# Patient Record
Sex: MALE | Race: WHITE | NOT HISPANIC OR LATINO | Employment: OTHER | URBAN - METROPOLITAN AREA
[De-identification: names, ages, dates, MRNs, and addresses within clinical notes are randomized per-mention and may not be internally consistent; named-entity substitution may affect disease eponyms.]

---

## 2024-07-29 NOTE — PROGRESS NOTES
Subjective   Patient ID: Daniel Mcgarry is a 79 y.o. male who presents for No chief complaint on file..   HPI  PT PRESENTS FOR A F/U ON HIS H/O BLADDER CANCER.  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  PT HAS RECEIVED 6 WEEKS OF INDUCTION THERAPY -- LAST THERAPY WAS IN MARCH OF 2024  PT HAS A H/O PROSTATE CANCER - S/P RADICAL PROSTATECTOMY 2001  Are you experiencing:  Burning on urination -- NO  Pain on urination  -- NO  Urinary frequency -- NO  Urinary urgency -- NO  Urge incontinence -- NO  Urinary stress incontinence   SURINDER----PULL UPS changed per day -- 1-2 XX__, 2-3 __, 3-4__ 5 +__ -- DAMP  RARE ENURESIS  Nocturia-- 2 X ON AVG  Hematuria -- NO  Hesitancy -- NO  Post void fullness --   NO    Review of Systems  General-- No C/O fever or chills  Head-- No C/O Dizziness  Eyes-- NO  C/O blurry or double vision  Ears-- No C/O hearing loss  Neck-- Supple  Chest-- No C/O pain or discomfort  Lungs-- No C/O shortness of breath  Abdomen-- No C/O  pain or discomfort, No nausea or vomiting  Back-- No C/O back pain or discomfort  Extremities-- No C/O swelling or pain      Objective   Physical Exam    General-- well developed, well nourished in NAD  Head-- normal cephalic, atraumatic  Eyes-- PERRL, EOM'S FROM,  no  jaundice  Neck-- Supple, without masses  Chest-- Normal bony structure  Abdomen-- soft, non tender, liver spleen not tender. No supra pubic masses,RECTUS DIASTASES  Back-- no flank masses palpable, no CVA tenderness on palpation or perc;ussion  Lymph nodes-- No inguinal lymphadenopathy noted  Prostate-- FOSSA firm, smooth, non tender,without nodules  Testis-- both down, non tender, without masses  Epididymis-- no masses palpable  Scrotum -- no hydrocele noted  Extremities -- Normal muscle mass and tone for the patients age  Neurological-- oriented times three    PVR-- 7 ML    Assessment/Plan   A:  H/O BLADDER CANCER -- PT JUST FINISHED 6 WEEKS OF BCG THERAPY MARCH 2024  REMOTE H/O PROSTATE  CANCER -- REMOTE RADICAL PROSTATECTOMY 2001  URINARY STRESS INCONTINENCE SECONDARY TO THE ABOVE     REMOTE MI MARCH 2024    P:  OBTAIN ALL OLD RECORDS FROM [facility]  F/U IN ONE WEEK FOR A SURVEILLANCE CYSTO   OBTAIN LATEST PSA FROM [facility]    Harman Castle MD 07/29/24 8:55 AM

## 2024-07-30 PROBLEM — R20.3 HYPERESTHESIA: Status: ACTIVE | Noted: 2024-07-30

## 2024-07-30 PROBLEM — C67.9 BLADDER CANCER (MULTI): Status: ACTIVE | Noted: 2024-07-30

## 2024-07-30 PROBLEM — H61.21 EXCESSIVE CERUMEN IN RIGHT EAR CANAL: Status: ACTIVE | Noted: 2024-07-30

## 2024-07-30 PROBLEM — C61 PROSTATE CANCER (MULTI): Status: ACTIVE | Noted: 2024-07-30

## 2024-07-30 PROBLEM — I10 HTN (HYPERTENSION): Status: ACTIVE | Noted: 2024-07-30

## 2024-07-30 PROBLEM — H60.91 RIGHT OTITIS EXTERNA: Status: ACTIVE | Noted: 2024-07-30

## 2024-07-30 PROBLEM — G56.01 CARPAL TUNNEL SYNDROME ON RIGHT: Status: ACTIVE | Noted: 2024-07-30

## 2024-07-30 PROBLEM — E78.5 HYPERLIPIDEMIA: Status: ACTIVE | Noted: 2024-07-30

## 2024-07-30 PROBLEM — K21.9 ACID REFLUX: Status: ACTIVE | Noted: 2024-07-30

## 2024-07-30 RX ORDER — LOSARTAN POTASSIUM 50 MG/1
TABLET ORAL
COMMUNITY
Start: 2014-08-11 | End: 2024-07-31 | Stop reason: WASHOUT

## 2024-07-30 RX ORDER — OMEPRAZOLE 20 MG/1
1 CAPSULE, DELAYED RELEASE ORAL DAILY
COMMUNITY
Start: 2015-11-04

## 2024-07-31 ENCOUNTER — OFFICE VISIT (OUTPATIENT)
Dept: UROLOGY | Facility: CLINIC | Age: 79
End: 2024-07-31
Payer: COMMERCIAL

## 2024-07-31 VITALS
BODY MASS INDEX: 29.57 KG/M2 | DIASTOLIC BLOOD PRESSURE: 69 MMHG | TEMPERATURE: 97.2 F | SYSTOLIC BLOOD PRESSURE: 134 MMHG | HEIGHT: 66 IN | WEIGHT: 184 LBS

## 2024-07-31 DIAGNOSIS — C61 PROSTATE CANCER (MULTI): Primary | ICD-10-CM

## 2024-07-31 DIAGNOSIS — N39.3 URINARY STRESS INCONTINENCE, MALE: ICD-10-CM

## 2024-07-31 DIAGNOSIS — C67.2 MALIGNANT NEOPLASM OF LATERAL WALL OF URINARY BLADDER (MULTI): ICD-10-CM

## 2024-07-31 DIAGNOSIS — R35.1 NOCTURIA: ICD-10-CM

## 2024-07-31 DIAGNOSIS — R32 ENURESIS: ICD-10-CM

## 2024-07-31 DIAGNOSIS — N40.0 BPH WITHOUT OBSTRUCTION/LOWER URINARY TRACT SYMPTOMS: ICD-10-CM

## 2024-07-31 LAB
POC APPEARANCE, URINE: CLEAR
POC BILIRUBIN, URINE: NEGATIVE
POC BLOOD, URINE: NEGATIVE
POC COLOR, URINE: YELLOW
POC GLUCOSE, URINE: NEGATIVE MG/DL
POC KETONES, URINE: NEGATIVE MG/DL
POC LEUKOCYTES, URINE: NEGATIVE
POC NITRITE,URINE: NEGATIVE
POC PH, URINE: 5.5 PH
POC PROTEIN, URINE: NEGATIVE MG/DL
POC SPECIFIC GRAVITY, URINE: 1.02
POC UROBILINOGEN, URINE: 0.2 EU/DL

## 2024-07-31 PROCEDURE — 81003 URINALYSIS AUTO W/O SCOPE: CPT | Performed by: UROLOGY

## 2024-07-31 PROCEDURE — 1036F TOBACCO NON-USER: CPT | Performed by: UROLOGY

## 2024-07-31 PROCEDURE — 99215 OFFICE O/P EST HI 40 MIN: CPT | Performed by: UROLOGY

## 2024-07-31 PROCEDURE — 1126F AMNT PAIN NOTED NONE PRSNT: CPT | Performed by: UROLOGY

## 2024-07-31 PROCEDURE — 1160F RVW MEDS BY RX/DR IN RCRD: CPT | Performed by: UROLOGY

## 2024-07-31 PROCEDURE — 3078F DIAST BP <80 MM HG: CPT | Performed by: UROLOGY

## 2024-07-31 PROCEDURE — 1159F MED LIST DOCD IN RCRD: CPT | Performed by: UROLOGY

## 2024-07-31 PROCEDURE — 3075F SYST BP GE 130 - 139MM HG: CPT | Performed by: UROLOGY

## 2024-07-31 RX ORDER — ATORVASTATIN CALCIUM 40 MG/1
TABLET, FILM COATED ORAL
COMMUNITY
Start: 2024-07-08

## 2024-07-31 RX ORDER — CLOPIDOGREL BISULFATE 75 MG/1
TABLET ORAL
COMMUNITY
Start: 2024-05-22

## 2024-07-31 RX ORDER — METOPROLOL TARTRATE 25 MG/1
TABLET, FILM COATED ORAL
COMMUNITY
Start: 2024-07-22

## 2024-07-31 RX ORDER — ASPIRIN 81 MG/1
81 TABLET ORAL
COMMUNITY
Start: 2024-03-14

## 2024-07-31 RX ORDER — PREDNISONE 10 MG/1
TABLET ORAL
COMMUNITY
Start: 2024-07-17

## 2024-07-31 RX ORDER — AMLODIPINE BESYLATE 5 MG/1
TABLET ORAL
COMMUNITY
Start: 2024-07-02

## 2024-07-31 RX ORDER — LISINOPRIL 10 MG/1
TABLET ORAL
COMMUNITY
Start: 2024-07-08

## 2024-07-31 SDOH — ECONOMIC STABILITY: FOOD INSECURITY: WITHIN THE PAST 12 MONTHS, THE FOOD YOU BOUGHT JUST DIDN'T LAST AND YOU DIDN'T HAVE MONEY TO GET MORE.: NEVER TRUE

## 2024-07-31 SDOH — ECONOMIC STABILITY: FOOD INSECURITY: WITHIN THE PAST 12 MONTHS, YOU WORRIED THAT YOUR FOOD WOULD RUN OUT BEFORE YOU GOT MONEY TO BUY MORE.: NEVER TRUE

## 2024-07-31 ASSESSMENT — ENCOUNTER SYMPTOMS
LOSS OF SENSATION IN FEET: 0
DEPRESSION: 0
OCCASIONAL FEELINGS OF UNSTEADINESS: 0

## 2024-07-31 ASSESSMENT — LIFESTYLE VARIABLES
HOW OFTEN DO YOU HAVE A DRINK CONTAINING ALCOHOL: MONTHLY OR LESS
SKIP TO QUESTIONS 9-10: 1
AUDIT-C TOTAL SCORE: 1
HOW MANY STANDARD DRINKS CONTAINING ALCOHOL DO YOU HAVE ON A TYPICAL DAY: 1 OR 2
HOW OFTEN DO YOU HAVE SIX OR MORE DRINKS ON ONE OCCASION: NEVER

## 2024-07-31 ASSESSMENT — PATIENT HEALTH QUESTIONNAIRE - PHQ9
SUM OF ALL RESPONSES TO PHQ9 QUESTIONS 1 AND 2: 0
1. LITTLE INTEREST OR PLEASURE IN DOING THINGS: NOT AT ALL
2. FEELING DOWN, DEPRESSED OR HOPELESS: NOT AT ALL

## 2024-07-31 ASSESSMENT — COLUMBIA-SUICIDE SEVERITY RATING SCALE - C-SSRS
6. HAVE YOU EVER DONE ANYTHING, STARTED TO DO ANYTHING, OR PREPARED TO DO ANYTHING TO END YOUR LIFE?: NO
1. IN THE PAST MONTH, HAVE YOU WISHED YOU WERE DEAD OR WISHED YOU COULD GO TO SLEEP AND NOT WAKE UP?: NO
2. HAVE YOU ACTUALLY HAD ANY THOUGHTS OF KILLING YOURSELF?: NO

## 2024-07-31 ASSESSMENT — PAIN SCALES - GENERAL: PAINLEVEL: 0-NO PAIN

## 2024-08-02 LAB
LABORATORY COMMENT REPORT: NORMAL
LABORATORY COMMENT REPORT: NORMAL
PATH REPORT.FINAL DX SPEC: NORMAL
PATH REPORT.GROSS SPEC: NORMAL
PATH REPORT.RELEVANT HX SPEC: NORMAL
PATH REPORT.TOTAL CANCER: NORMAL

## 2024-08-14 ENCOUNTER — APPOINTMENT (OUTPATIENT)
Dept: UROLOGY | Facility: CLINIC | Age: 79
End: 2024-08-14
Payer: COMMERCIAL

## 2024-08-14 VITALS
TEMPERATURE: 97.8 F | HEART RATE: 71 BPM | BODY MASS INDEX: 34.04 KG/M2 | HEIGHT: 62 IN | DIASTOLIC BLOOD PRESSURE: 72 MMHG | SYSTOLIC BLOOD PRESSURE: 144 MMHG | WEIGHT: 185 LBS

## 2024-08-14 DIAGNOSIS — C67.9 MALIGNANT NEOPLASM OF URINARY BLADDER, UNSPECIFIED SITE (MULTI): ICD-10-CM

## 2024-08-14 LAB
POC APPEARANCE, URINE: CLEAR
POC BILIRUBIN, URINE: NEGATIVE
POC BLOOD, URINE: NEGATIVE
POC COLOR, URINE: YELLOW
POC GLUCOSE, URINE: NEGATIVE MG/DL
POC KETONES, URINE: NEGATIVE MG/DL
POC LEUKOCYTES, URINE: ABNORMAL
POC NITRITE,URINE: NEGATIVE
POC PH, URINE: 5.5 PH
POC PROTEIN, URINE: NEGATIVE MG/DL
POC SPECIFIC GRAVITY, URINE: 1.02
POC UROBILINOGEN, URINE: 0.2 EU/DL

## 2024-08-14 PROCEDURE — 81003 URINALYSIS AUTO W/O SCOPE: CPT | Mod: QW | Performed by: UROLOGY

## 2024-08-14 PROCEDURE — 52000 CYSTOURETHROSCOPY: CPT | Performed by: UROLOGY

## 2024-08-14 RX ORDER — CEPHALEXIN 250 MG/1
250 CAPSULE ORAL 2 TIMES DAILY
Qty: 4 CAPSULE | Refills: 0 | Status: SHIPPED | OUTPATIENT
Start: 2024-08-14 | End: 2024-08-21

## 2024-08-14 ASSESSMENT — ENCOUNTER SYMPTOMS
OCCASIONAL FEELINGS OF UNSTEADINESS: 0
LOSS OF SENSATION IN FEET: 0

## 2024-08-14 ASSESSMENT — PAIN SCALES - GENERAL: PAINLEVEL: 0-NO PAIN

## 2024-08-14 NOTE — LETTER
August 14, 2024     No Recipients    Patient: Daniel Mcgarry   YOB: 1945   Date of Visit: 8/14/2024       Dear Dr. Grey Recipients:    Thank you for referring Daniel Mcgarry to me for evaluation. Below are my notes for this consultation.  If you have questions, please do not hesitate to call me. I look forward to following your patient along with you.       Sincerely,     Harman Ramsey MD      CC: No Recipients  ______________________________________________________________________________________    Patient ID: Daniel Mcgarry is a 79 y.o. male.  Subjective  Patient ID: Daniel Mcgarry is a 79 y.o. male who presents for No chief complaint on file..  Procedure:  After informed consent was obtained, the patient was taken to the procedure room for cystoscopy due to AH/O BLADDER CANCER - PT S/P 6 WEEKS OF BCG THERAPY .     Cystoscopy     Procedure Note:    A sterile prep and drape was performed in standard fashion. Lidocaine was used for topical anesthesia. A flexible cystoscope was inserted into the urethra without difficulty revealing normal urethra.     The prostate  IS OBSTRUCTING     Then entered the bladder revealing bladder mucosa with no erythematous patches or plaques, foreign bodies, stones or papillary lesions. The ureteral orifices were visualized bilaterally. These were orthotopic in location and effluxing clear urine. No masses were seen on retroflexion.     Post-Procedure:   The cystoscope was removed. The vital signs were stable . The patient tolerated the procedure well. There were no complications.       A:   REMOTE H/O BLADDER CANCER-- PT IS GETTING INTRAVESICAL BCG  CYSTO TODAY IS WNL    P:  REASSURANCE, EDUCATION RENDERED TO THE PT  CONTINUE INTRAVESICAL BCG  F/U IN 3 MONTHS FOR A SURVEILLANCE CYSTO     HARMAN RAMSEY MD  8-14-24

## 2024-08-14 NOTE — PROGRESS NOTES
Patient ID: Daniel Mcgarry is a 79 y.o. male.  Subjective   Patient ID: Daniel Mcgarry is a 79 y.o. male who presents for No chief complaint on file..  Procedure:  After informed consent was obtained, the patient was taken to the procedure room for cystoscopy due to AH/O BLADDER CANCER - PT S/P 6 WEEKS OF BCG THERAPY .     Cystoscopy     Procedure Note:    A sterile prep and drape was performed in standard fashion. Lidocaine was used for topical anesthesia. A flexible cystoscope was inserted into the urethra without difficulty revealing normal urethra.     The prostate  IS ABSENT    Then entered the bladder revealing bladder mucosa with no erythematous patches or plaques, foreign bodies, stones or papillary lesions. The ureteral orifices were visualized bilaterally. These were orthotopic in location and effluxing clear urine. No masses were seen on retroflexion.     Post-Procedure:   The cystoscope was removed. The vital signs were stable . The patient tolerated the procedure well. There were no complications.     7-31-24 -- URINE CYTOLOGY -- RARE ATYPICAL CELL -- NOT THOUGHT TO BE SECONDARY TO UROTHELIAL CARCINOMA       A: PT S/P TURBT 11-6-13 -- NON INVASIVE UROTHELIAL CARCINOMA  PT S/P BLADDER BX ON 1-22-19-- CARCINOMA IN SITU NOTED -- BCG STARTED ON 3-12-19  PT S/P BLADDER BX ON 9-3-19-- PERSISTENT CIS-- BCG WITH INTERFERON STARTED-- INTERFERON STOPPED ON 11-12-19 SECONDARY TO COST  POSITIVE FISH TEST 3-1-20, 6-11-20, 3-11-21 AND 6-15-23  CYSTO'S, AND IVP DONE DURING THIS TIME WERE ALL NEG FOR OBVIOUS CANCER  PT S/P BLADDER BX ON 1-27-24 -- CIS AGAIN NOTED-- BCG RESTARTED ON 3-12-24  CYSTO IS WNL TODAY    PT S/P RADICAL PROSTATECTOMY 2-10-00 -- DR MEANS    P:  REASSURANCE, EDUCATION RENDERED TO THE PT  CONTINUE INTRAVESICAL BCG  F/U IN 3 MONTHS FOR A SURVEILLANCE CYSTO     IMELDA ARMSEY MD  8-14-24    ADDENDUM:  I CHANGED THE PROSTATE EXAM TO ABSENT FROM MILDLY OBSTRUCTING. THIS WAS MY ERROR IN DOCUMENTATION  BECAUSE  THE PROSTATE WAS REMOVED IN 2000

## 2024-08-21 ENCOUNTER — OFFICE VISIT (OUTPATIENT)
Dept: UROLOGY | Facility: CLINIC | Age: 79
End: 2024-08-21
Payer: COMMERCIAL

## 2024-08-21 DIAGNOSIS — C61 PROSTATE CANCER (MULTI): Primary | ICD-10-CM

## 2024-08-21 DIAGNOSIS — C67.9 MALIGNANT NEOPLASM OF URINARY BLADDER, UNSPECIFIED SITE (MULTI): ICD-10-CM

## 2024-08-21 PROCEDURE — 99443 PR PHYS/QHP TELEPHONE EVALUATION 21-30 MIN: CPT | Performed by: STUDENT IN AN ORGANIZED HEALTH CARE EDUCATION/TRAINING PROGRAM

## 2024-08-21 RX ORDER — ALBUTEROL SULFATE 0.83 MG/ML
3 SOLUTION RESPIRATORY (INHALATION) AS NEEDED
OUTPATIENT
Start: 2024-09-17

## 2024-08-21 RX ORDER — DIPHENHYDRAMINE HYDROCHLORIDE 50 MG/ML
50 INJECTION INTRAMUSCULAR; INTRAVENOUS AS NEEDED
OUTPATIENT
Start: 2024-09-17

## 2024-08-21 RX ORDER — FAMOTIDINE 10 MG/ML
20 INJECTION INTRAVENOUS ONCE AS NEEDED
OUTPATIENT
Start: 2024-09-17

## 2024-08-21 RX ORDER — ALBUTEROL SULFATE 0.83 MG/ML
3 SOLUTION RESPIRATORY (INHALATION) AS NEEDED
OUTPATIENT
Start: 2024-09-03

## 2024-08-21 RX ORDER — EPINEPHRINE 0.3 MG/.3ML
0.3 INJECTION SUBCUTANEOUS EVERY 5 MIN PRN
OUTPATIENT
Start: 2024-09-10

## 2024-08-21 RX ORDER — EPINEPHRINE 0.3 MG/.3ML
0.3 INJECTION SUBCUTANEOUS EVERY 5 MIN PRN
OUTPATIENT
Start: 2024-09-03

## 2024-08-21 RX ORDER — FAMOTIDINE 10 MG/ML
20 INJECTION INTRAVENOUS ONCE AS NEEDED
OUTPATIENT
Start: 2024-09-03

## 2024-08-21 RX ORDER — DIPHENHYDRAMINE HYDROCHLORIDE 50 MG/ML
50 INJECTION INTRAMUSCULAR; INTRAVENOUS AS NEEDED
OUTPATIENT
Start: 2024-09-10

## 2024-08-21 RX ORDER — DIPHENHYDRAMINE HYDROCHLORIDE 50 MG/ML
50 INJECTION INTRAMUSCULAR; INTRAVENOUS AS NEEDED
OUTPATIENT
Start: 2024-09-03

## 2024-08-21 RX ORDER — EPINEPHRINE 0.3 MG/.3ML
0.3 INJECTION SUBCUTANEOUS EVERY 5 MIN PRN
OUTPATIENT
Start: 2024-09-17

## 2024-08-21 RX ORDER — FAMOTIDINE 10 MG/ML
20 INJECTION INTRAVENOUS ONCE AS NEEDED
OUTPATIENT
Start: 2024-09-10

## 2024-08-21 RX ORDER — ALBUTEROL SULFATE 0.83 MG/ML
3 SOLUTION RESPIRATORY (INHALATION) AS NEEDED
OUTPATIENT
Start: 2024-09-10

## 2024-08-21 NOTE — PROGRESS NOTES
Subjective   Patient ID: Daniel Mcgarry is a 79 y.o. male who presents for bladder cancer, NMIBC.    Patient is here at request of Dr. Castle for management of NMIBC.  The records have been reviewed from the media tab from Dr. Castle's previously practice.    Bladder cancer:  Date of diagnosis: 11/2013  Path of diagnosis: pTa, unclear grade  Date of recurrence 1: 1/2019  Path at recurrence 1: CIS  Treatment at recurrence BCG 3/2019  Date of recurrence 2: 9/2019  Path at recurrence 2: CIS  Treatment at recurrence: BCG with IFN  Positive FISh in 3/2020, 6/2020, 3/2021 and 6/2023 yet cystoscopy and IVPs negative  Date of recurrence 3: 1/2024  Path at recurrence 3: CIS  Treatment at recurrence 3: BCG induction, last received 4/2024    He did get mBCG at some point.  He thinks this was 4-5 years ago.     Prostate cancer:  Patient has a history of prostate cancer s/p prostatectomy in 2001.  He has LADARIUS and is in 2 depends a day.    PMHx: GERD, HTN, HLD  PSHx: Prostatectomy  Social: Non Smoker  Family: No Bladder Cancer    Review of Systems  Negative unless discussed above    Objective   Physical Exam  Phone visit    Assessment/Plan   Problem List Items Addressed This Visit             ICD-10-CM    Bladder cancer (Multi) C67.9     Recurrent CIS over the years as above.  The criteria for BCG unresponsive disease are below in the diagram:            BCG refractory - failure to achieve CR within 6 months of initial BCG    BCG resistant - persistent tumor noted 3 months after initial BCG but to lesser degree/stage/grade which clears at 6 months    BCG relapsing - recurrent tumor in follow-up after achieving CR within 6 months of initial BCG    BCG intolerant - recurrent tumor in follow-up after inadequate initial BCG due to toxicity                  I cannot tell if he meets criteria for failure.  I do worry that he still harbors CIS in his urinary tract despite a negative white light cystoscopy and cytology.  I have a few  recommendations:    We can do mBCG for at least 1 year for now but this would be split vial dosing given BCG shortages.  We discussed how this has been shown to be effective just the same.  We discussed locations we give BCG and he prefers [place].  He knows he will have to sign consent on first day of treatment.  I would start the first mBCG now and then if continues to respond do this at 6 and 12 months from induction regimen.  I would highly recommend at time of next surveillance cystoscopy to do blue light cystoscopy with directed and random bladder biopsies and a biopsy of the prostatic urethra to ensure that CIS is not hiding.  We went over the data regarding CIS and blue light.  If he has a recurrence again he should not get BCG and move to other options which I am happy to help with.         Prostate cancer (Multi) - Primary C61            Anshu Neal MD MPH 08/21/24 8:33 AM

## 2024-08-21 NOTE — ASSESSMENT & PLAN NOTE
Recurrent CIS over the years as above.  The criteria for BCG unresponsive disease are below in the diagram:            BCG refractory - failure to achieve CR within 6 months of initial BCG    BCG resistant - persistent tumor noted 3 months after initial BCG but to lesser degree/stage/grade which clears at 6 months    BCG relapsing - recurrent tumor in follow-up after achieving CR within 6 months of initial BCG    BCG intolerant - recurrent tumor in follow-up after inadequate initial BCG due to toxicity                  I cannot tell if he meets criteria for failure.  I do worry that he still harbors CIS in his urinary tract despite a negative white light cystoscopy and cytology.  I have a few recommendations:    We can do mBCG for at least 1 year for now but this would be split vial dosing given BCG shortages.  We discussed how this has been shown to be effective just the same.  We discussed locations we give BCG and he prefers [place].  He knows he will have to sign consent on first day of treatment.  I would start the first mBCG now and then if continues to respond do this at 6 and 12 months from induction regimen.  I would highly recommend at time of next surveillance cystoscopy to do blue light cystoscopy with directed and random bladder biopsies and a biopsy of the prostatic urethra to ensure that CIS is not hiding.  We went over the data regarding CIS and blue light.  If he has a recurrence again he should not get BCG and move to other options which I am happy to help with.

## 2024-11-12 RX ORDER — LIDOCAINE HYDROCHLORIDE 20 MG/ML
1 JELLY TOPICAL ONCE
Status: COMPLETED | OUTPATIENT
Start: 2024-11-18 | End: 2024-11-18

## 2024-11-17 NOTE — PROGRESS NOTES
Patient ID: Daniel Mcgarry is a 79 y.o. male. PRESENTS FOR A SURVEILLANCE CYSTO IN FOLLOW UP ON HIS H/O BLADDER CANCER    Procedure:  After informed consent was obtained, the patient was taken to the procedure room for cystoscopy due to A H/O BLADDER CANCER .     Cystoscopy     Procedure Note:    A sterile prep and drape was performed in standard fashion. Lidocaine was used for topical anesthesia. A flexible cystoscope was inserted into the urethra without difficulty revealing normal urethra.     The prostate ABSENT     Then entered the bladder revealing bladder mucosa with no erythematous patches or plaques, foreign bodies, stones or papillary lesions. The ureteral orifices were visualized bilaterally. These were orthotopic in location and effluxing clear urine. No masses were seen on retroflexion. AREA MEDIAL TO THE RIGHT URETERAL ORIFICE APPEARS SLIGHTLY RAISED AND VERY SLIGHTLY IRREGULAR     Post-Procedure:   The cystoscope was removed. The vital signs were stable . The patient tolerated the procedure well. There were no complications.     PSA:  9-22-21-- < 0.01  10-1-20-- <  0.1  10-3-19-- < 0.1  4-21-16-- < 0.1    A:   PT S/P TURBT 11-6-13 -- NON INVASIVE UROTHELIAL CARCINOMA  PT S/P BLADDER BX ON 1-22-19-- CARCINOMA IN SITU NOTED -- BCG STARTED ON 3-12-19  PT S/P BLADDER BX ON 9-3-19-- PERSISTENT CIS-- BCG WITH INTERFERON STARTED-- INTERFERON STOPPED ON 11-12-19 SECONDARY TO COST  POSITIVE FISH TEST 3-1-20, 6-11-20, 3-11-21 AND 6-15-23  CYSTO'S, AND IVP DONE DURING THIS TIME WERE ALL NEG FOR OBVIOUS CANCER  PT S/P BLADDER BX ON 1-27-24 -- CIS AGAIN NOTED-- BCG RESTARTED ON 3-12-24    PT IS NO LONGER GETTING BCG THERAPY     CYSTO IS SLIGHTLY ABN  TODAY 11-18-24    PT S/P RADICAL PROSTATECTOMY 2-10-00 -- DR MEANS     P:  REASSURANCE, EDUCATION RENDERED TO THE PT  URINE FOR A CYTOLOGY -- IF POSITIVE WILL SCHEDULE A BLADDER BX OF THE AREA ADJACENT TO THE RIGHT URETERAL ORIFICE-- IF NEG --F/U IN 3 MONTHS FOR A  SURVEILLANCE CYSTO    11-25-24  URINE CYTOLOGY -- NEG FOR MALIGNANT CELLS  F/U IN 3 MONTHS FOR A SURVEILLANCE, CYSTO

## 2024-11-18 ENCOUNTER — APPOINTMENT (OUTPATIENT)
Dept: UROLOGY | Facility: CLINIC | Age: 79
End: 2024-11-18
Payer: COMMERCIAL

## 2024-11-18 VITALS
SYSTOLIC BLOOD PRESSURE: 150 MMHG | HEART RATE: 73 BPM | WEIGHT: 184 LBS | BODY MASS INDEX: 29.57 KG/M2 | DIASTOLIC BLOOD PRESSURE: 81 MMHG | TEMPERATURE: 97.8 F | HEIGHT: 66 IN

## 2024-11-18 DIAGNOSIS — C67.9 MALIGNANT NEOPLASM OF URINARY BLADDER, UNSPECIFIED SITE (MULTI): Primary | ICD-10-CM

## 2024-11-18 LAB
POC APPEARANCE, URINE: CLEAR
POC BILIRUBIN, URINE: NEGATIVE
POC BLOOD, URINE: ABNORMAL
POC COLOR, URINE: YELLOW
POC GLUCOSE, URINE: NEGATIVE MG/DL
POC KETONES, URINE: NEGATIVE MG/DL
POC LEUKOCYTES, URINE: ABNORMAL
POC NITRITE,URINE: NEGATIVE
POC PH, URINE: 5.5 PH
POC PROTEIN, URINE: NEGATIVE MG/DL
POC SPECIFIC GRAVITY, URINE: 1.01
POC UROBILINOGEN, URINE: 0.2 EU/DL

## 2024-11-18 PROCEDURE — 2500000005 HC RX 250 GENERAL PHARMACY W/O HCPCS: Performed by: UROLOGY

## 2024-11-18 PROCEDURE — 52000 CYSTOURETHROSCOPY: CPT | Performed by: UROLOGY

## 2024-11-18 PROCEDURE — 81003 URINALYSIS AUTO W/O SCOPE: CPT | Mod: QW | Performed by: UROLOGY

## 2024-11-18 RX ORDER — CEPHALEXIN 250 MG/1
250 CAPSULE ORAL 2 TIMES DAILY
Qty: 4 CAPSULE | Refills: 0 | Status: SHIPPED | OUTPATIENT
Start: 2024-11-18 | End: 2024-11-20

## 2024-11-18 ASSESSMENT — PAIN SCALES - GENERAL: PAINLEVEL_OUTOF10: 0-NO PAIN

## 2024-11-18 ASSESSMENT — ENCOUNTER SYMPTOMS
DEPRESSION: 0
OCCASIONAL FEELINGS OF UNSTEADINESS: 0

## 2024-11-25 LAB
LABORATORY COMMENT REPORT: NORMAL
LABORATORY COMMENT REPORT: NORMAL
PATH REPORT.COMMENTS IMP SPEC: NORMAL
PATH REPORT.FINAL DX SPEC: NORMAL
PATH REPORT.GROSS SPEC: NORMAL
PATH REPORT.RELEVANT HX SPEC: NORMAL
PATH REPORT.TOTAL CANCER: NORMAL

## 2025-02-17 RX ORDER — LIDOCAINE HYDROCHLORIDE 20 MG/ML
1 JELLY TOPICAL ONCE
Status: COMPLETED | OUTPATIENT
Start: 2025-02-17 | End: 2025-02-24

## 2025-02-19 ENCOUNTER — APPOINTMENT (OUTPATIENT)
Dept: UROLOGY | Facility: CLINIC | Age: 80
End: 2025-02-19
Payer: COMMERCIAL

## 2025-02-22 NOTE — PROGRESS NOTES
Patient ID: Daniel Mcgarry is a 79 y.o. male.WITH A H/O BLADDER CANCER NOW  PRESENTS FOR A SURVEILLANCE CYSTO.     Procedure:  After informed consent was obtained, the patient was taken to the procedure room for cystoscopy due to A H/O BLADDER CANCER .      Cystoscopy      Procedure Note:    A sterile prep and drape was performed in standard fashion. Lidocaine was used for topical anesthesia. A flexible cystoscope was inserted into the urethra without difficulty revealing normal urethra.      The prostate ABSENT      Then entered the bladder revealing bladder mucosa with no erythematous patches or plaques, foreign bodies, stones--SUBTLE AREA OF  papillary lesions NOTED ON THE DOME OF THE BLADDER  The ureteral orifices were visualized bilaterally. These were orthotopic in location and effluxing clear urine. No masses were seen on retroflexion. AREA MEDIAL TO THE RIGHT URETERAL ORIFICE APPEARS SLIGHTLY RAISED AND VERY SLIGHTLY IRREGULAR      Post-Procedure:   The cystoscope was removed. The vital signs were stable . The patient tolerated the procedure well. There were no complications.      PSA:  9-22-21-- < 0.01  10-1-20-- <  0.1  10-3-19-- < 0.1  4-21-16-- < 0.1    2-24-25  URINALYSIS DIPSTICK-- WNL     A:   OLD RECORDS FROM U REVIEWED   PT S/P TURBT 11-6-13 -- NON INVASIVE UROTHELIAL CARCINOMA  PT S/P BLADDER BX ON 1-22-19-- CARCINOMA IN SITU NOTED -- BCG STARTED ON 3-12-19  PT S/P BLADDER BX ON 9-3-19-- PERSISTENT CIS-- BCG WITH INTERFERON STARTED-- INTERFERON STOPPED ON 11-12-19 SECONDARY TO COST  POSITIVE FISH TEST 3-1-20, 6-11-20, 3-11-21 AND 6-15-23  CYSTO'S, AND IVP DONE DURING THIS TIME WERE ALL NEG FOR OBVIOUS CANCER  PT S/P BLADDER BX ON 1-27-24 -- CIS AGAIN NOTED-- BCG RESTARTED ON 3-12-24     PT IS NO LONGER GETTING BCG THERAPY     ABN CYSTO TODAY 2-24-25-- AREA OF SMALL GROUPING OF SMALL PAPILLOMAS ON THE DOME OF THE BLADDER -- COULD BE INFLAMMATORY VS RECURRENCE IN HIS BLADDER CANCER  ABOVE DISCUSSED  IN DETAIL WITH THE PT     PT S/P RADICAL PROSTATECTOMY 2-10-00 -- DR MEANS  PSA REMAINS UNDETECTABLE      P:  SCHEDULE:  CYSTO, BLADDER BX, OUT PT, GEN ANES   IMELDA RAMSEY MD  2-24-25 11-25-24  URINE CYTOLOGY -- NEG FOR MALIGNANT CELLS  F/U IN 3 MONTHS FOR A SURVEILLANCE, CYSTO

## 2025-02-24 ENCOUNTER — APPOINTMENT (OUTPATIENT)
Dept: UROLOGY | Facility: CLINIC | Age: 80
End: 2025-02-24
Payer: COMMERCIAL

## 2025-02-24 VITALS
RESPIRATION RATE: 15 BRPM | BODY MASS INDEX: 29.57 KG/M2 | OXYGEN SATURATION: 96 % | HEIGHT: 66 IN | DIASTOLIC BLOOD PRESSURE: 72 MMHG | TEMPERATURE: 98.2 F | HEART RATE: 65 BPM | SYSTOLIC BLOOD PRESSURE: 140 MMHG | WEIGHT: 184 LBS

## 2025-02-24 DIAGNOSIS — Z79.2 PROPHYLACTIC ANTIBIOTIC: ICD-10-CM

## 2025-02-24 DIAGNOSIS — C67.9 MALIGNANT NEOPLASM OF URINARY BLADDER, UNSPECIFIED SITE (MULTI): ICD-10-CM

## 2025-02-24 PROCEDURE — 52000 CYSTOURETHROSCOPY: CPT | Performed by: UROLOGY

## 2025-02-24 PROCEDURE — 2500000005 HC RX 250 GENERAL PHARMACY W/O HCPCS: Performed by: UROLOGY

## 2025-02-24 PROCEDURE — 81003 URINALYSIS AUTO W/O SCOPE: CPT | Mod: QW | Performed by: UROLOGY

## 2025-02-24 RX ORDER — CEPHALEXIN 250 MG/1
250 CAPSULE ORAL 2 TIMES DAILY
Qty: 4 CAPSULE | Refills: 0 | Status: SHIPPED | OUTPATIENT
Start: 2025-02-24 | End: 2025-02-26

## 2025-02-24 RX ADMIN — LIDOCAINE HYDROCHLORIDE 1 APPLICATION: 20 JELLY TOPICAL at 11:33

## 2025-02-24 ASSESSMENT — PAIN SCALES - GENERAL: PAINLEVEL_OUTOF10: 0-NO PAIN

## 2025-02-24 ASSESSMENT — ENCOUNTER SYMPTOMS
OCCASIONAL FEELINGS OF UNSTEADINESS: 0
DEPRESSION: 0
LOSS OF SENSATION IN FEET: 0

## 2025-02-24 NOTE — LETTER
February 28, 2025     Felice Urrutia MD  [address]  [place]  [facility] [address]    Patient: Daniel Mcgarry   YOB: 1945   Date of Visit: 2/24/2025       Dear Dr. Felice Urrutia:    Thank you for referring Daniel Mcgarry to me for evaluation. Below are my notes for this consultation.  If you have questions, please do not hesitate to call me. I look forward to following your patient along with you.       Sincerely,     Harman Castle MD      CC: No Recipients  ______________________________________________________________________________________    Patient ID: Daniel Mcgarry is a 79 y.o. male.WITH A H/O BLADDER CANCER NOW  PRESENTS FOR A SURVEILLANCE CYSTO.     Procedure:  After informed consent was obtained, the patient was taken to the procedure room for cystoscopy due to A H/O BLADDER CANCER .      Cystoscopy      Procedure Note:    A sterile prep and drape was performed in standard fashion. Lidocaine was used for topical anesthesia. A flexible cystoscope was inserted into the urethra without difficulty revealing normal urethra.      The prostate ABSENT      Then entered the bladder revealing bladder mucosa with no erythematous patches or plaques, foreign bodies, stones--SUBTLE AREA OF  papillary lesions NOTED ON THE DOME OF THE BLADDER  The ureteral orifices were visualized bilaterally. These were orthotopic in location and effluxing clear urine. No masses were seen on retroflexion. AREA MEDIAL TO THE RIGHT URETERAL ORIFICE APPEARS SLIGHTLY RAISED AND VERY SLIGHTLY IRREGULAR      Post-Procedure:   The cystoscope was removed. The vital signs were stable . The patient tolerated the procedure well. There were no complications.      PSA:  9-22-21-- < 0.01  10-1-20-- <  0.1  10-3-19-- < 0.1  4-21-16-- < 0.1    2-24-25  URINALYSIS DIPSTICK-- WNL     A:   OLD RECORDS FROM [facility] REVIEWED   PT S/P TURBT 11-6-13 -- NON INVASIVE UROTHELIAL CARCINOMA  PT S/P BLADDER BX ON 1-22-19--  CARCINOMA IN SITU NOTED -- BCG STARTED ON 3-12-19  PT S/P BLADDER BX ON 9-3-19-- PERSISTENT CIS-- BCG WITH INTERFERON STARTED-- INTERFERON STOPPED ON 11-12-19 SECONDARY TO COST  POSITIVE FISH TEST 3-1-20, 6-11-20, 3-11-21 AND 6-15-23  CYSTO'S, AND IVP DONE DURING THIS TIME WERE ALL NEG FOR OBVIOUS CANCER  PT S/P BLADDER BX ON 1-27-24 -- CIS AGAIN NOTED-- BCG RESTARTED ON 3-12-24     PT IS NO LONGER GETTING BCG THERAPY     ABN CYSTO TODAY 2-24-25-- AREA OF SMALL GROUPING OF SMALL PAPILLOMAS ON THE DOME OF THE BLADDER -- COULD BE INFLAMMATORY VS RECURRENCE IN HIS BLADDER CANCER  ABOVE DISCUSSED IN DETAIL WITH THE PT     PT S/P RADICAL PROSTATECTOMY 2-10-00 -- DR MEANS  PSA REMAINS UNDETECTABLE      P:  SCHEDULE:  CYSTO, BLADDER BX, OUT PT, GEN ANES   IMELDA RAMSEY MD  2-24-25 11-25-24  URINE CYTOLOGY -- NEG FOR MALIGNANT CELLS  F/U IN 3 MONTHS FOR A SURVEILLANCE, CYSTO

## 2025-02-27 ENCOUNTER — PREP FOR PROCEDURE (OUTPATIENT)
Dept: UROLOGY | Facility: CLINIC | Age: 80
End: 2025-02-27
Payer: COMMERCIAL

## 2025-02-27 DIAGNOSIS — C67.1 MALIGNANT NEOPLASM OF DOME OF URINARY BLADDER (MULTI): Primary | ICD-10-CM

## 2025-02-27 DIAGNOSIS — R82.90 UNSPECIFIED ABNORMAL FINDINGS IN URINE: ICD-10-CM

## 2025-02-27 RX ORDER — CEFAZOLIN SODIUM 2 G/100ML
2 INJECTION, SOLUTION INTRAVENOUS ONCE
OUTPATIENT
Start: 2025-02-27 | End: 2025-02-27

## 2025-02-27 NOTE — H&P
History Of Present Illness  Daniel Mcgarry is a 79 y.o. male presenting with  POSSIBLE RECURRENT CANCER ON THE DOME OF THE URINARY BLADDER SEEN ON A SURVEILLANCE CYSTO. .     Past Medical History  He has a past medical history of Acute upper respiratory infection, unspecified (04/15/2015), Atopic dermatitis, unspecified (12/08/2013), Cancer (Multi), Chronic obstructive pulmonary disease, unspecified (04/15/2015), Diarrhea, unspecified (12/08/2013), Dorsalgia, unspecified, Impacted cerumen, unspecified ear (12/08/2013), Mixed hyperlipidemia (12/08/2013), Nocturia (12/08/2013), Other conditions influencing health status (12/08/2013), Otitis media, unspecified, unspecified ear (12/08/2013), and Personal history of other specified conditions (11/04/2015).    Surgical History  He has a past surgical history that includes Mouth surgery (11/04/2015) and Other surgical history (08/06/2014).     Social History  He reports that he has quit smoking. His smoking use included cigarettes. He has never used smokeless tobacco. He reports current alcohol use. He reports that he does not use drugs.    Family History  Family History   Problem Relation Name Age of Onset    No Known Problems Father          Allergies  Patient has no known allergies.    Review of Systems     Physical Exam     Last Recorded Vitals  There were no vitals taken for this visit.    Relevant Results    No results found for this or any previous visit (from the past 24 hours).    No results found.         Assessment/Plan   A:  PT HAS A POSSIBLE RECURRENT CANCER ON THE DOME OF THE URINARY BLADDER SEEN ON A SURVEILLANCE CYSTO. .  P:  \SCHEDULE:  CYSTO, BLADDER BX, OUT PT, GEN ANES    I spent 20 minutes in the professional and overall care of this patient.    Harman Castle MD  2-27-25  14:38

## 2025-03-10 ENCOUNTER — APPOINTMENT (OUTPATIENT)
Dept: PREADMISSION TESTING | Facility: HOSPITAL | Age: 80
End: 2025-03-10
Payer: COMMERCIAL

## 2025-03-11 ENCOUNTER — PRE-ADMISSION TESTING (OUTPATIENT)
Dept: PREADMISSION TESTING | Facility: HOSPITAL | Age: 80
End: 2025-03-11
Payer: COMMERCIAL

## 2025-03-11 VITALS
DIASTOLIC BLOOD PRESSURE: 71 MMHG | HEART RATE: 83 BPM | OXYGEN SATURATION: 96 % | HEIGHT: 66 IN | TEMPERATURE: 96.8 F | SYSTOLIC BLOOD PRESSURE: 130 MMHG | BODY MASS INDEX: 29.83 KG/M2 | WEIGHT: 185.63 LBS | RESPIRATION RATE: 20 BRPM

## 2025-03-11 DIAGNOSIS — C67.1 MALIGNANT NEOPLASM OF DOME OF URINARY BLADDER (MULTI): ICD-10-CM

## 2025-03-11 DIAGNOSIS — Z01.818 PREOP TESTING: Primary | ICD-10-CM

## 2025-03-11 DIAGNOSIS — R82.90 UNSPECIFIED ABNORMAL FINDINGS IN URINE: ICD-10-CM

## 2025-03-11 LAB
ANION GAP SERPL CALC-SCNC: 12 MMOL/L (ref 10–20)
APPEARANCE UR: CLEAR
BASOPHILS # BLD AUTO: 0.08 X10*3/UL (ref 0–0.1)
BASOPHILS NFR BLD AUTO: 1 %
BILIRUB UR STRIP.AUTO-MCNC: NEGATIVE MG/DL
BUN SERPL-MCNC: 25 MG/DL (ref 6–23)
CALCIUM SERPL-MCNC: 9.6 MG/DL (ref 8.6–10.3)
CHLORIDE SERPL-SCNC: 107 MMOL/L (ref 98–107)
CO2 SERPL-SCNC: 23 MMOL/L (ref 21–32)
COLOR UR: NORMAL
CREAT SERPL-MCNC: 1.26 MG/DL (ref 0.5–1.3)
EGFRCR SERPLBLD CKD-EPI 2021: 57 ML/MIN/1.73M*2
EOSINOPHIL # BLD AUTO: 0.16 X10*3/UL (ref 0–0.4)
EOSINOPHIL NFR BLD AUTO: 2.1 %
ERYTHROCYTE [DISTWIDTH] IN BLOOD BY AUTOMATED COUNT: 13.7 % (ref 11.5–14.5)
GLUCOSE SERPL-MCNC: 119 MG/DL (ref 74–99)
GLUCOSE UR STRIP.AUTO-MCNC: NORMAL MG/DL
HCT VFR BLD AUTO: 44.1 % (ref 41–52)
HGB BLD-MCNC: 14.7 G/DL (ref 13.5–17.5)
IMM GRANULOCYTES # BLD AUTO: 0.02 X10*3/UL (ref 0–0.5)
IMM GRANULOCYTES NFR BLD AUTO: 0.3 % (ref 0–0.9)
KETONES UR STRIP.AUTO-MCNC: NEGATIVE MG/DL
LEUKOCYTE ESTERASE UR QL STRIP.AUTO: NEGATIVE
LYMPHOCYTES # BLD AUTO: 1.63 X10*3/UL (ref 0.8–3)
LYMPHOCYTES NFR BLD AUTO: 21.3 %
MCH RBC QN AUTO: 29.9 PG (ref 26–34)
MCHC RBC AUTO-ENTMCNC: 33.3 G/DL (ref 32–36)
MCV RBC AUTO: 90 FL (ref 80–100)
MONOCYTES # BLD AUTO: 0.78 X10*3/UL (ref 0.05–0.8)
MONOCYTES NFR BLD AUTO: 10.2 %
NEUTROPHILS # BLD AUTO: 4.99 X10*3/UL (ref 1.6–5.5)
NEUTROPHILS NFR BLD AUTO: 65.1 %
NITRITE UR QL STRIP.AUTO: NEGATIVE
NRBC BLD-RTO: 0 /100 WBCS (ref 0–0)
PH UR STRIP.AUTO: 5 [PH]
PLATELET # BLD AUTO: 217 X10*3/UL (ref 150–450)
POTASSIUM SERPL-SCNC: 4.3 MMOL/L (ref 3.5–5.3)
PROT UR STRIP.AUTO-MCNC: NEGATIVE MG/DL
RBC # BLD AUTO: 4.92 X10*6/UL (ref 4.5–5.9)
RBC # UR STRIP.AUTO: NEGATIVE MG/DL
SODIUM SERPL-SCNC: 138 MMOL/L (ref 136–145)
SP GR UR STRIP.AUTO: 1.02
UROBILINOGEN UR STRIP.AUTO-MCNC: NORMAL MG/DL
WBC # BLD AUTO: 7.7 X10*3/UL (ref 4.4–11.3)

## 2025-03-11 PROCEDURE — 80048 BASIC METABOLIC PNL TOTAL CA: CPT | Performed by: UROLOGY

## 2025-03-11 PROCEDURE — 99204 OFFICE O/P NEW MOD 45 MIN: CPT

## 2025-03-11 PROCEDURE — 85025 COMPLETE CBC W/AUTO DIFF WBC: CPT | Performed by: UROLOGY

## 2025-03-11 PROCEDURE — 93005 ELECTROCARDIOGRAM TRACING: CPT

## 2025-03-11 PROCEDURE — 81003 URINALYSIS AUTO W/O SCOPE: CPT

## 2025-03-11 ASSESSMENT — DUKE ACTIVITY SCORE INDEX (DASI)
CAN YOU TAKE CARE OF YOURSELF (EAT, DRESS, BATHE, OR USE TOILET): YES
CAN YOU WALK A BLOCK OR TWO ON LEVEL GROUND: YES
CAN YOU PARTICIPATE IN STRENOUS SPORTS LIKE SWIMMING, SINGLES TENNIS, FOOTBALL, BASKETBALL, OR SKIING: NO
CAN YOU HAVE SEXUAL RELATIONS: NO
CAN YOU DO YARD WORK LIKE RAKING LEAVES, WEEDING OR PUSHING A MOWER: NO
CAN YOU RUN A SHORT DISTANCE: YES
CAN YOU DO MODERATE WORK AROUND THE HOUSE LIKE VACUUMING, SWEEPING FLOORS OR CARRYING GROCERIES: YES
CAN YOU WALK INDOORS, SUCH AS AROUND YOUR HOUSE: YES
CAN YOU PARTICIPATE IN MODERATE RECREATIONAL ACTIVITIES LIKE GOLF, BOWLING, DANCING, DOUBLES TENNIS OR THROWING A BASEBALL OR FOOTBALL: YES
CAN YOU CLIMB A FLIGHT OF STAIRS OR WALK UP A HILL: YES
CAN YOU DO LIGHT WORK AROUND THE HOUSE LIKE DUSTING OR WASHING DISHES: YES
CAN YOU DO HEAVY WORK AROUND THE HOUSE LIKE SCRUBBING FLOORS OR LIFTING AND MOVING HEAVY FURNITURE: YES
DASI METS SCORE: 7.8
TOTAL_SCORE: 40.95

## 2025-03-11 ASSESSMENT — PAIN SCALES - GENERAL: PAINLEVEL_OUTOF10: 0 - NO PAIN

## 2025-03-11 ASSESSMENT — PAIN - FUNCTIONAL ASSESSMENT: PAIN_FUNCTIONAL_ASSESSMENT: 0-10

## 2025-03-11 NOTE — CPM/PAT H&P
CPM/PAT Evaluation       Name: Daniel Mcgarry (Daniel Mcgarry)  /Age: 1945/79 y.o.     Visit Type:   In-Person       Chief Complaint: Bladder cancer requiring intervention    HPI  Patient is an 79 year old male with a history of HTN, HLD, CAD with inferior STEMI s/p DEL (3/2024), DVT/PE, paroxysmal Afib, CKD stage III, GERD, prostate and bladder cancer who presents today for preoperative evaluation. Patient follows with Dr. Castle for malignant neoplasm of the bladder and he is scheduled for cystoscopy with biopsy on 3/18/25 with Dr. Castle. Patient denies recent illness, fever, chills, fatigue, cough, shortness of breath, chest pain, lower extremity edema, urinary or GI symptoms.   Has an abrasion on his left leg from scraping it against the , but denies any excessive bleeding, drainage, surrounding warmth or erythema.    Past Medical History:   Diagnosis Date    Acute upper respiratory infection, unspecified 04/15/2015    Viral URI with cough    Angina pectoris     Atopic dermatitis, unspecified 2013    Atopic dermatitis    Bladder cancer (Multi)     Cancer (Multi)     Chronic obstructive pulmonary disease, unspecified 04/15/2015    Chronic obstructive pulmonary disease    Dental disease     Diarrhea, unspecified 2013    Diarrhea    Dorsalgia, unspecified     Chronic bilateral back pain, unspecified back location    DVT (deep vein thrombosis) in pregnancy ([facility])     GERD (gastroesophageal reflux disease)     HL (hearing loss)     Hypertension     Impacted cerumen, unspecified ear 2013    Cerumen impaction    Mixed hyperlipidemia 2013    Mixed hyperlipidemia    Myocardial infarction (Multi)     Nocturia 2013    Nocturia    Other conditions influencing health status 2013    Neoplasm Of The Epididymis    Otitis media, unspecified, unspecified ear 2013    Otitis media    Paroxysmal atrial fibrillation (Multi)     Personal history of other specified  conditions 11/04/2015    History of dysphagia    Prostate cancer (Multi)     Pulmonary embolism     Vision loss        Past Surgical History:   Procedure Laterality Date    BLADDER SURGERY      COLONOSCOPY      CORONARY STENT PLACEMENT      HERNIA REPAIR      MOUTH SURGERY  11/04/2015    Oral Surgery Tooth Extraction    OTHER SURGICAL HISTORY  08/06/2014    Prostatectomy Retropubic    PROSTATE SURGERY         Patient  has no history on file for sexual activity.    Family History   Problem Relation Name Age of Onset    Heart disease Mother      Heart disease Father         No Known Allergies    Prior to Admission medications    Medication Sig Start Date End Date Taking? Authorizing Provider   amLODIPine (Norvasc) 5 mg tablet  7/2/24   Historical Provider, MD   aspirin 81 mg EC tablet 1 tablet (81 mg). 3/14/24   Historical Provider, MD   atorvastatin (Lipitor) 40 mg tablet  7/8/24   Historical Provider, MD   clopidogrel (Plavix) 75 mg tablet  5/22/24   Historical Provider, MD   lisinopril 10 mg tablet  7/8/24   Historical Provider, MD   metoprolol tartrate (Lopressor) 25 mg tablet  7/22/24   Historical Provider, MD   omeprazole (PriLOSEC) 20 mg DR capsule Take 1 capsule (20 mg) by mouth once daily. 11/4/15   Historical Provider, MD   predniSONE (Deltasone) 10 mg tablet  7/17/24   Historical Provider, MD        A ten-point review of systems was completed and is otherwise negative except for what is mentioned in the HPI above.    Physical Exam:    GENERAL: Well developed, awake/alert/oriented x3, no distress, alert and cooperative  HEENT: MMM  NECK: Trachea midline, no lymphadenopathy  CARDIOVASCULAR: RRR, normal S1 and S 2, no murmurs, 2+ equal pulses of the extremities  RESPIRATORY: Patent airways, CTAB, normal breath sounds with good chest expansion, thorax symmetric  ABDOMEN: Soft, non-tender, no distention  SKIN: Warm and dry  EXTREMITIES: No cyanosis, edema. Abrasion to left lower leg covered with bandage  NEURO:  "A&O x 3, normal motor and sensation, no focal deficits   PSYCH: Appropriate mood and behavior      PAT AIRWAY:   Airway:     Mallampati::  I    TM distance::  >3 FB    Neck ROM::  Full  normal          Visit Vitals  /71   Pulse 83   Temp 36 °C (96.8 °F) (Temporal)   Resp 20   Ht 1.676 m (5' 6\")   Wt 84.2 kg (185 lb 10 oz)   SpO2 96%   BMI 29.96 kg/m²   Smoking Status Former   BSA 1.98 m²       DASI Risk Score      Flowsheet Row Pre-Admission Testing from 3/11/2025 in [facility]   Can you take care of yourself (eat, dress, bathe, or use toilet)?  2.75 filed at 03/11/2025 1400   Can you walk indoors, such as around your house? 1.75 filed at 03/11/2025 1400   Can you walk a block or two on level ground?  2.75 filed at 03/11/2025 1400   Can you climb a flight of stairs or walk up a hill? 5.5 filed at 03/11/2025 1400   Can you run a short distance? 8 filed at 03/11/2025 1400   Can you do light work around the house like dusting or washing dishes? 2.7 filed at 03/11/2025 1400   Can you do moderate work around the house like vacuuming, sweeping floors or carrying groceries? 3.5 filed at 03/11/2025 1400   Can you do heavy work around the house like scrubbing floors or lifting and moving heavy furniture?  8 filed at 03/11/2025 1400   Can you do yard work like raking leaves, weeding or pushing a mower? 0 filed at 03/11/2025 1400   Can you have sexual relations? 0 filed at 03/11/2025 1400   Can you participate in moderate recreational activities like golf, bowling, dancing, doubles tennis or throwing a baseball or football? 6 filed at 03/11/2025 1400   Can you participate in strenous sports like swimming, singles tennis, football, basketball, or skiing? 0 filed at 03/11/2025 1400   DASI SCORE 40.95 filed at 03/11/2025 1400   METS Score (Will be calculated only when all the questions are answered) 7.8 filed at 03/11/2025 1400          Caprini DVT Assessment    No data to display       Modified Frailty Index  "   No data to display       [id number] Stroke Risk Points  Current as of just now        3 0 to 9 Points:      No Change          The [id number]-[id number] risk score (Lip CORBY, et al. 2009. © 2010 American College [employer]) quantifies the risk of stroke for a patient with atrial fibrillation. For patients without atrial fibrillation or under the age of 18 this score appears as N/A. Higher score values generally indicate higher risk of stroke.          Points Metrics   0 Has Congestive Heart Failure:  No     Patients with congestive heart failure get 1 point.    Current as of just now   1 Has Hypertension:  Yes     Patients with hypertension get 1 point.    Current as of just now   2 Age:  79     Patients 65 to 74 years old get 1 point, or patients 75 years and older get 2 points.    Current as of just now   0 Has Diabetes:  No     Patients with diabetes get 1 point.    Current as of just now   0 Had Stroke:  No  Had TIA:  No  Had Thromboembolism:  No     Patients who have had a stroke, TIA, or thromboembolism get 2 points.    Current as of just now   0 Has Vascular Disease:  No     Patients with vascular disease get 1 point.    Current as of just now   0 Clinically Relevant Sex:  Male     Patients with a clinically relevant sex of Female get 1 point.    Current as of just now             Revised Cardiac Risk Index    No data to display       Apfel Simplified Score    No data to display       Risk Analysis Index Results This Encounter    No data found in the last 10 encounters.       Stop Bang Score      Flowsheet Row Pre-Admission Testing from 3/11/2025 in [facility]   Do you snore loudly? 1 filed at 03/11/2025 1400   Do you often feel tired or fatigued after your sleep? 0 filed at 03/11/2025 1400   Has anyone ever observed you stop breathing in your sleep? 0 filed at 03/11/2025 1400   Do you have or are you being treated for high blood pressure? 1 filed at 03/11/2025 1400   Recent BMI (Calculated) 30  filed at 03/11/2025 1400   Is BMI greater than 35 kg/m2? 0=No filed at 03/11/2025 1400   Age older than 50 years old? 1=Yes filed at 03/11/2025 1400   Is your neck circumference greater than 17 inches (Male) or 16 inches (Female)? 1 filed at 03/11/2025 1400   Gender - Male 1=Yes filed at 03/11/2025 1400   STOP-BANG Total Score 5 filed at 03/11/2025 1400          Prodigy: High Risk  Total Score: 24              Prodigy Age Score      Prodigy Gender Score          ARISCAT Score for Postoperative Pulmonary Complications      Flowsheet Row Pre-Admission Testing from 3/11/2025 in [facility]   Age Calculated Score 16 filed at 03/11/2025 1446   Preoperative SpO2 0 filed at 03/11/2025 1446   Respiratory infection in the last month Either upper or lower (i.e., URI, bronchitis, pneumonia), with fever and antibiotic treatment 0 filed at 03/11/2025 1446   Preoperative anemia (Hgb less than 10 g/dl) 0 filed at 03/11/2025 1446   Surgical incision  0 filed at 03/11/2025 1446   Duration of surgery  0 filed at 03/11/2025 1446   Emergency Procedure  0 filed at 03/11/2025 1446   ARISCAT Total Score  16 filed at 03/11/2025 1446          Beach Perioperative Risk for Myocardial Infarction or Cardiac Arrest (MIKE)      Flowsheet Row Pre-Admission Testing from 3/11/2025 in [facility]   Calculated Age Score 1.62 filed at 03/11/2025 1447   Functional Status  0 filed at 03/11/2025 1447   ASA Class  -1.92 filed at 03/11/2025 1447   Creatinine 0 filed at 03/11/2025 1447   Type of Procedure  -0.26 filed at 03/11/2025 1447   MIKE Total Score  -5.81 filed at 03/11/2025 1447   MIKE % 0.3 filed at 03/11/2025 1447            Assessment and Plan:   Patient is an 79 year old male with a history of HTN, HLD, CAD with inferior STEMI s/p DEL (3/2024), DVT/PE, paroxysmal Afib, CKD stage III, GERD, prostate and bladder cancer.    #Malignant neoplasm of the bladder   Scheduled for cystoscopy with biopsy on 3/18/25 with   Corrine.    #HTN, Hx  #HLD, Hx  #CAD with STEMI s/p DEL  #DVT, Hx  #PE, Hx  #Paroxysmal Afib, Hx  -Follows with Dr. Chawla with cardiology - last seen on 11/26/24  -Currently maintained on amlodipine, lisinopril, metoprolol, atorvastatin, aspirin and plavix     >AC hold request sent to cardiology on 2/27 from Dr. Castle's office and pending official return - although patient states that someone from Dr. Castle's office called him and told him to hold his plavix and aspirin starting on 3/12.   -Echo 3/2024: EF 50-55%, mild hypokinesis basal inferior wall  -LHC from 3/2024 with DESx2 to RCA  -Paroxysmal afib was in 2020 with PE and was for less than 24 hours according to cardiology documentation (patient is unable to recall), not on AC  -EKG obtained today demonstrates normal sinus rhythm with RBBB and age undetermined inferior infarct (known from 2024).    #CKD stage III, Hx  BUN 32/Cr 1.3 from 12/12/24  - baseline Cr per PCP  BUN 25/Cr 1.26 today on 3/11/25    Labs from 12/12/24 reviewed (BMP, A1c):  A1c 5.7  BUN 32/Cr 1.3 - baseline Cr per PCP  Labs obtained today demonstrate BUN 25, Cr 1.26. CBC and UA unremarkable  EKG obtained today demonstrates normal sinus rhythm with RBBB and age undetermined inferior infarct (known from 2024).    I spent 45 minutes in the professional and overall care of this patient. Greater than 50% of this time was spent counseling patient, reviewing plan of care and discussing medication perioperative management.    Andreina Riggins, APRN-CNP

## 2025-03-11 NOTE — H&P (VIEW-ONLY)
CPM/PAT Evaluation       Name: Daniel Mcgarry (Daniel Mcgarry)  /Age: 1945/79 y.o.     Visit Type:   In-Person       Chief Complaint: Bladder cancer requiring intervention    HPI  Patient is an 79 year old male with a history of HTN, HLD, CAD with inferior STEMI s/p DEL (3/2024), DVT/PE, paroxysmal Afib, CKD stage III, GERD, prostate and bladder cancer who presents today for preoperative evaluation. Patient follows with Dr. Castle for malignant neoplasm of the bladder and he is scheduled for cystoscopy with biopsy on 3/18/25 with Dr. Castle. Patient denies recent illness, fever, chills, fatigue, cough, shortness of breath, chest pain, lower extremity edema, urinary or GI symptoms.   Has an abrasion on his left leg from scraping it against the , but denies any excessive bleeding, drainage, surrounding warmth or erythema.    Past Medical History:   Diagnosis Date    Acute upper respiratory infection, unspecified 04/15/2015    Viral URI with cough    Angina pectoris     Atopic dermatitis, unspecified 2013    Atopic dermatitis    Bladder cancer (Multi)     Cancer (Multi)     Chronic obstructive pulmonary disease, unspecified 04/15/2015    Chronic obstructive pulmonary disease    Dental disease     Diarrhea, unspecified 2013    Diarrhea    Dorsalgia, unspecified     Chronic bilateral back pain, unspecified back location    DVT (deep vein thrombosis) in pregnancy ([facility])     GERD (gastroesophageal reflux disease)     HL (hearing loss)     Hypertension     Impacted cerumen, unspecified ear 2013    Cerumen impaction    Mixed hyperlipidemia 2013    Mixed hyperlipidemia    Myocardial infarction (Multi)     Nocturia 2013    Nocturia    Other conditions influencing health status 2013    Neoplasm Of The Epididymis    Otitis media, unspecified, unspecified ear 2013    Otitis media    Paroxysmal atrial fibrillation (Multi)     Personal history of other specified  conditions 11/04/2015    History of dysphagia    Prostate cancer (Multi)     Pulmonary embolism     Vision loss        Past Surgical History:   Procedure Laterality Date    BLADDER SURGERY      COLONOSCOPY      CORONARY STENT PLACEMENT      HERNIA REPAIR      MOUTH SURGERY  11/04/2015    Oral Surgery Tooth Extraction    OTHER SURGICAL HISTORY  08/06/2014    Prostatectomy Retropubic    PROSTATE SURGERY         Patient  has no history on file for sexual activity.    Family History   Problem Relation Name Age of Onset    Heart disease Mother      Heart disease Father         No Known Allergies    Prior to Admission medications    Medication Sig Start Date End Date Taking? Authorizing Provider   amLODIPine (Norvasc) 5 mg tablet  7/2/24   Historical Provider, MD   aspirin 81 mg EC tablet 1 tablet (81 mg). 3/14/24   Historical Provider, MD   atorvastatin (Lipitor) 40 mg tablet  7/8/24   Historical Provider, MD   clopidogrel (Plavix) 75 mg tablet  5/22/24   Historical Provider, MD   lisinopril 10 mg tablet  7/8/24   Historical Provider, MD   metoprolol tartrate (Lopressor) 25 mg tablet  7/22/24   Historical Provider, MD   omeprazole (PriLOSEC) 20 mg DR capsule Take 1 capsule (20 mg) by mouth once daily. 11/4/15   Historical Provider, MD   predniSONE (Deltasone) 10 mg tablet  7/17/24   Historical Provider, MD        A ten-point review of systems was completed and is otherwise negative except for what is mentioned in the HPI above.    Physical Exam:    GENERAL: Well developed, awake/alert/oriented x3, no distress, alert and cooperative  HEENT: MMM  NECK: Trachea midline, no lymphadenopathy  CARDIOVASCULAR: RRR, normal S1 and S 2, no murmurs, 2+ equal pulses of the extremities  RESPIRATORY: Patent airways, CTAB, normal breath sounds with good chest expansion, thorax symmetric  ABDOMEN: Soft, non-tender, no distention  SKIN: Warm and dry  EXTREMITIES: No cyanosis, edema. Abrasion to left lower leg covered with bandage  NEURO:  "A&O x 3, normal motor and sensation, no focal deficits   PSYCH: Appropriate mood and behavior      PAT AIRWAY:   Airway:     Mallampati::  I    TM distance::  >3 FB    Neck ROM::  Full  normal          Visit Vitals  /71   Pulse 83   Temp 36 °C (96.8 °F) (Temporal)   Resp 20   Ht 1.676 m (5' 6\")   Wt 84.2 kg (185 lb 10 oz)   SpO2 96%   BMI 29.96 kg/m²   Smoking Status Former   BSA 1.98 m²       DASI Risk Score      Flowsheet Row Pre-Admission Testing from 3/11/2025 in [facility]   Can you take care of yourself (eat, dress, bathe, or use toilet)?  2.75 filed at 03/11/2025 1400   Can you walk indoors, such as around your house? 1.75 filed at 03/11/2025 1400   Can you walk a block or two on level ground?  2.75 filed at 03/11/2025 1400   Can you climb a flight of stairs or walk up a hill? 5.5 filed at 03/11/2025 1400   Can you run a short distance? 8 filed at 03/11/2025 1400   Can you do light work around the house like dusting or washing dishes? 2.7 filed at 03/11/2025 1400   Can you do moderate work around the house like vacuuming, sweeping floors or carrying groceries? 3.5 filed at 03/11/2025 1400   Can you do heavy work around the house like scrubbing floors or lifting and moving heavy furniture?  8 filed at 03/11/2025 1400   Can you do yard work like raking leaves, weeding or pushing a mower? 0 filed at 03/11/2025 1400   Can you have sexual relations? 0 filed at 03/11/2025 1400   Can you participate in moderate recreational activities like golf, bowling, dancing, doubles tennis or throwing a baseball or football? 6 filed at 03/11/2025 1400   Can you participate in strenous sports like swimming, singles tennis, football, basketball, or skiing? 0 filed at 03/11/2025 1400   DASI SCORE 40.95 filed at 03/11/2025 1400   METS Score (Will be calculated only when all the questions are answered) 7.8 filed at 03/11/2025 1400          Caprini DVT Assessment    No data to display       Modified Frailty Index  "   No data to display       [id number] Stroke Risk Points  Current as of just now        3 0 to 9 Points:      No Change          The [id number]-[id number] risk score (Lip CORBY, et al. 2009. © 2010 American College [employer] Chest [employer]) quantifies the risk of stroke for a patient with atrial fibrillation. For patients without atrial fibrillation or under the age of 18 this score appears as N/A. Higher score values generally indicate higher risk of stroke.          Points Metrics   0 Has Congestive Heart Failure:  No     Patients with congestive heart failure get 1 point.    Current as of just now   1 Has Hypertension:  Yes     Patients with hypertension get 1 point.    Current as of just now   2 Age:  79     Patients 65 to 74 years old get 1 point, or patients 75 years and older get 2 points.    Current as of just now   0 Has Diabetes:  No     Patients with diabetes get 1 point.    Current as of just now   0 Had Stroke:  No  Had TIA:  No  Had Thromboembolism:  No     Patients who have had a stroke, TIA, or thromboembolism get 2 points.    Current as of just now   0 Has Vascular Disease:  No     Patients with vascular disease get 1 point.    Current as of just now   0 Clinically Relevant Sex:  Male     Patients with a clinically relevant sex of Female get 1 point.    Current as of just now             Revised Cardiac Risk Index    No data to display       Apfel Simplified Score    No data to display       Risk Analysis Index Results This Encounter    No data found in the last 10 encounters.       Stop Bang Score      Flowsheet Row Pre-Admission Testing from 3/11/20[facility] in [facility]   Do you snore loudly? 1 filed at 03/11/2025 1400   Do you often feel tired or fatigued after your sleep? 0 filed at 03/11/2025 1400   Has anyone ever observed you stop breathing in your sleep? 0 filed at 03/11/2025 1400   Do you have or are you being treated for high blood pressure? 1 filed at 03/11/2025 1400   Recent BMI (Calculated) 30  filed at 03/11/2025 1400   Is BMI greater than 35 kg/m2? 0=No filed at 03/11/2025 1400   Age older than 50 years old? 1=Yes filed at 03/11/2025 1400   Is your neck circumference greater than 17 inches (Male) or 16 inches (Female)? 1 filed at 03/11/2025 1400   Gender - Male 1=Yes filed at 03/11/2025 1400   STOP-BANG Total Score 5 filed at 03/11/2025 1400          Prodigy: High Risk  Total Score: 24              Prodigy Age Score      Prodigy Gender Score          ARISCAT Score for Postoperative Pulmonary Complications      Flowsheet Row Pre-Admission Testing from 3/11/2025 in [facility]   Age Calculated Score 16 filed at 03/11/2025 1446   Preoperative SpO2 0 filed at 03/11/2025 1446   Respiratory infection in the last month Either upper or lower (i.e., URI, bronchitis, pneumonia), with fever and antibiotic treatment 0 filed at 03/11/2025 1446   Preoperative anemia (Hgb less than 10 g/dl) 0 filed at 03/11/2025 1446   Surgical incision  0 filed at 03/11/2025 1446   Duration of surgery  0 filed at 03/11/2025 1446   Emergency Procedure  0 filed at 03/11/2025 1446   ARISCAT Total Score  16 filed at 03/11/2025 1446          Beach Perioperative Risk for Myocardial Infarction or Cardiac Arrest (MIKE)      Flowsheet Row Pre-Admission Testing from 3/11/2025 in [facility]   Calculated Age Score 1.62 filed at 03/11/2025 1447   Functional Status  0 filed at 03/11/2025 1447   ASA Class  -1.92 filed at 03/11/2025 1447   Creatinine 0 filed at 03/11/2025 1447   Type of Procedure  -0.26 filed at 03/11/2025 1447   MIKE Total Score  -5.81 filed at 03/11/2025 1447   MIKE % 0.3 filed at 03/11/2025 1447            Assessment and Plan:   Patient is an 79 year old male with a history of HTN, HLD, CAD with inferior STEMI s/p DEL (3/2024), DVT/PE, paroxysmal Afib, CKD stage III, GERD, prostate and bladder cancer.    #Malignant neoplasm of the bladder   Scheduled for cystoscopy with biopsy on 3/18/25 with   Corrine.    #HTN, Hx  #HLD, Hx  #CAD with STEMI s/p DEL  #DVT, Hx  #PE, Hx  #Paroxysmal Afib, Hx  -Follows with Dr. Chawla with cardiology - last seen on 11/26/24  -Currently maintained on amlodipine, lisinopril, metoprolol, atorvastatin, aspirin and plavix     >AC hold request sent to cardiology on 2/27 from Dr. Castle's office and pending official return - although patient states that someone from Dr. Castle's office called him and told him to hold his plavix and aspirin starting on 3/12.   -Echo 3/2024: EF 50-55%, mild hypokinesis basal inferior wall  -LHC from 3/2024 with DESx2 to RCA  -Paroxysmal afib was in 2020 with PE and was for less than 24 hours according to cardiology documentation (patient is unable to recall), not on AC  -EKG obtained today demonstrates normal sinus rhythm with RBBB and age undetermined inferior infarct (known from 2024).    #CKD stage III, Hx  BUN 32/Cr 1.3 from 12/12/24  - baseline Cr per PCP  BUN 25/Cr 1.26 today on 3/11/25    Labs from 12/12/24 reviewed (BMP, A1c):  A1c 5.7  BUN 32/Cr 1.3 - baseline Cr per PCP  Labs obtained today demonstrate BUN 25, Cr 1.26. CBC and UA unremarkable  EKG obtained today demonstrates normal sinus rhythm with RBBB and age undetermined inferior infarct (known from 2024).    I spent 45 minutes in the professional and overall care of this patient. Greater than 50% of this time was spent counseling patient, reviewing plan of care and discussing medication perioperative management.    Andreina Riggins, APRN-CNP

## 2025-03-11 NOTE — PREPROCEDURE INSTRUCTIONS
Medication List            Accurate as of March 11, 2025  2:23 PM. Always use your most recent med list.                amLODIPine 5 mg tablet  Commonly known as: Norvasc  Medication Adjustments for Surgery: Take/Use as prescribed     aspirin 81 mg EC tablet  Additional Medication Adjustments for Surgery: Other (Comment)  Notes to patient: Take/hold as directed by Dr. Castle and Dr. Cammy Crawford 3/12     atorvastatin 40 mg tablet  Commonly known as: Lipitor  Medication Adjustments for Surgery: Take/Use as prescribed     clopidogrel 75 mg tablet  Commonly known as: Plavix  Additional Medication Adjustments for Surgery: Other (Comment)  Notes to patient: Take/hold as directed by Dr. Castle and Dr. Cammy Crawford 3/12      lisinopril 10 mg tablet  Medication Adjustments for Surgery: Take last dose 1 day (24 hours) before surgery     metoprolol tartrate 25 mg tablet  Commonly known as: Lopressor  Medication Adjustments for Surgery: Take on the morning of surgery     omeprazole 20 mg DR capsule  Commonly known as: PriLOSEC  Medication Adjustments for Surgery: Take on the morning of surgery                              NPO Instructions:    Do not eat any food after midnight the night before your surgery/procedure.    Additional Instructions:     Day of Surgery:  Wear  comfortable loose fitting clothing  Do not use moisturizers, creams, lotions or perfume  All jewelry and valuables should be left at home        PRE-OPERATIVE INSTRUCTIONS FOR SURGERY    *Do not eat anything after midnight the night of surgery.  This includes food of any kind (including hard candy, cough drops, mints).   You may have up to 13 ounces of clear liquid  until TWO hours prior to your scheduled surgery time, unless ERAS* protocol is ordered for you.  Clear liquids include water, black tea/coffee, (no milk or cream) apple juice and electrolyte drinks (GATORADE).  You may chew gum until TWO hours prior you your surgery/procedure.     *ERAS  protocol: follow as instructed.  DO not drink an additional 13 ounces as noted above.        *One of our staff members will call you ONE business day before your surgery, between 11 am-2 pm to let you know the time to arrive.  If you have not received a call by 2 pm, call [phone]  *When you arrive at the hospital-->GO TO Registration on the ground floor  *Stop smoking 24 hours prior to surgery.  No Marijuana, CBD Oil or Vaping for 48 hours  *No alcohol 24 hours prior to surgery  *You will need a responsible adult to drive you home  -No acrylic nails or nail polish on at least one fingernail, NO polish on toes for foot surgery  -You may be asked to remove your dentures, partial plate, eyeglasses or contact lenses before going to surgery.  Please bring a case for these items.  -Body piercings need to be removed.  Jewelry and valuables should be left at home.  -Put on loose,  comfortable, clean clothing, that will accommodate bandages        What is a home antibacterial shower?  This shower is a way of cleaning the skin with a germ killing solution before surgery.  The solution contains chlorhexidine, commonly known as CHG.  CHG is a skin cleanser with germ killing ability.  Let your doctor know if you are allergic to chlorhexidine.    Why do I need to take a preoperative antibacterial shower?  Skin is not sterile.  It is best to try to make your skin as free of germs as possible before surgery.  Proper cleansing with a germ killing soap before surgery can lower the number of germs on your skin.  This helps to reduce the risk of infection at the surgical site.  Following the instructions listed below will help you prepare your skin for surgery.      How do I use the solution?    Steps: Begin using your CHG soap 5 days before your surgery on __________________.    *First, wash and rinse your hair using the CHG soap.  Keep CHG soap away from ear canals and eyes.   Rinse completely, do not condition.  Hair extensions  should be removed.    *Wash your face with your normal soap and rinse.   *Apply the CHG solution to a clean wet washcloth.  Turn the water off or move away from the water spray to avoid premature rinsing of the CHG soap as you are applying.  Firmly lather your entire body from the neck down.  Do not use on your face.    *Pay special attention to the area(s) where your incision(s) will be located unless they are on your face.  Avoid scrubbing your skin too hard.  The important part is to have the CHG soap sit on your skin for 3 minutes.   *When the 3 minutes are up, turn on the water and rinse the CHG solution off your body completely.  *Do not wash with regular soap after you  have  used the CHG soap solution.  *Pat  yourself dry with a clean, freshly laundered towel.  *Do not apply powders, deodorants or lotions.  *Dress in clean freshly laundered night clothes.    *Be sure to sleep with clean freshly laundered sheets.    *Be aware the CHG will cause stains on fabrics; if you wash them with bleach after use.  Rinse your washcloth and other linens that have contact with CHG completely.  Use only non-chlorine detergents to launder the items  used.  *The morning of surgery is the fifth day.  Repeat the above steps and dress in clean comfortable clothing.     What is oral/dental rinse?  It is mouthwash.  It is a way of cleaning the he mouth with a germ-killing solution before your surgery.  The solution contains chlorhexidine, commonly known as CHG.  It is used inside the mouth to kill a bacteria known as Staphylococcus aureus.  Let your doctor know if you are allergic to Chlorhexidine.    Why do I need to use CHG oral/ dental rinse?  The CHG oral/dental rinse helps to kill bacteria in your mouth know as Staphylococcus aureus.  This reduces the risk of infection at the surgical site.    Using your CHG oral/dental rinse    STEPS:    Use your CHG oral/dental rinse after you brush your teeth the night before (at bedtime)  and the morning of your surgery.  Follow all the directions on your prescription label.  *Use the cap on the container to measure 15 ml  *Swish (gargle if you can) the mouthwash in your mouth for at least 30 seconds, (do not swallow) and spit out  *After you use your CHG rinse, do not rinse your mouth with water, drink or eat.  Please refer to the prescription label for the appropriate time to resume oral intake.    What side effects might I have using the CHG oral/dental rinse?  CHG rinse will stick you plaque on the teeth.  Brush and floss just before use.   Teeth brushing will help avoid staining of the plaque during  use.  Teeth brushing will help avoid staining of plaque during  use.    Who should I contact if I have questions about the CHG oral/dental rinse and or CHG soap?  Please call [facility], Pre-Admission testing at [phone] if you have any questions.    What you may be asked to bring to surgery:  ___Crutches, walker  ___CPAP machine  ___Urine specimen

## 2025-03-12 LAB
ATRIAL RATE: 73 BPM
HOLD SPECIMEN: NORMAL
P AXIS: 31 DEGREES
P OFFSET: 190 MS
P ONSET: 135 MS
PR INTERVAL: 180 MS
Q ONSET: 225 MS
QRS COUNT: 12 BEATS
QRS DURATION: 130 MS
QT INTERVAL: 396 MS
QTC CALCULATION(BAZETT): 436 MS
QTC FREDERICIA: 423 MS
R AXIS: -13 DEGREES
T AXIS: 21 DEGREES
T OFFSET: 423 MS
VENTRICULAR RATE: 73 BPM

## 2025-03-18 ENCOUNTER — ANESTHESIA (OUTPATIENT)
Dept: OPERATING ROOM | Facility: HOSPITAL | Age: 80
End: 2025-03-18
Payer: COMMERCIAL

## 2025-03-18 ENCOUNTER — PHARMACY VISIT (OUTPATIENT)
Dept: PHARMACY | Facility: CLINIC | Age: 80
End: 2025-03-18
Payer: COMMERCIAL

## 2025-03-18 ENCOUNTER — HOSPITAL ENCOUNTER (OUTPATIENT)
Facility: HOSPITAL | Age: 80
Setting detail: OUTPATIENT SURGERY
Discharge: HOME | End: 2025-03-18
Attending: UROLOGY | Admitting: UROLOGY
Payer: COMMERCIAL

## 2025-03-18 ENCOUNTER — ANESTHESIA EVENT (OUTPATIENT)
Dept: OPERATING ROOM | Facility: HOSPITAL | Age: 80
End: 2025-03-18
Payer: COMMERCIAL

## 2025-03-18 VITALS
TEMPERATURE: 96.8 F | SYSTOLIC BLOOD PRESSURE: 148 MMHG | OXYGEN SATURATION: 96 % | WEIGHT: 185.63 LBS | DIASTOLIC BLOOD PRESSURE: 75 MMHG | HEIGHT: 66 IN | HEART RATE: 55 BPM | RESPIRATION RATE: 16 BRPM | BODY MASS INDEX: 29.83 KG/M2

## 2025-03-18 DIAGNOSIS — C67.1 MALIGNANT NEOPLASM OF DOME OF URINARY BLADDER (MULTI): Primary | ICD-10-CM

## 2025-03-18 PROCEDURE — 3700000001 HC GENERAL ANESTHESIA TIME - INITIAL BASE CHARGE: Performed by: UROLOGY

## 2025-03-18 PROCEDURE — 3700000002 HC GENERAL ANESTHESIA TIME - EACH INCREMENTAL 1 MINUTE: Performed by: UROLOGY

## 2025-03-18 PROCEDURE — 7100000002 HC RECOVERY ROOM TIME - EACH INCREMENTAL 1 MINUTE: Performed by: UROLOGY

## 2025-03-18 PROCEDURE — 52204 CYSTOSCOPY W/BIOPSY(S): CPT | Performed by: UROLOGY

## 2025-03-18 PROCEDURE — 88305 TISSUE EXAM BY PATHOLOGIST: CPT | Performed by: PATHOLOGY

## 2025-03-18 PROCEDURE — 88305 TISSUE EXAM BY PATHOLOGIST: CPT | Mod: TC,PARLAB | Performed by: UROLOGY

## 2025-03-18 PROCEDURE — 2500000004 HC RX 250 GENERAL PHARMACY W/ HCPCS (ALT 636 FOR OP/ED): Performed by: ANESTHESIOLOGIST ASSISTANT

## 2025-03-18 PROCEDURE — 3600000008 HC OR TIME - EACH INCREMENTAL 1 MINUTE - PROCEDURE LEVEL THREE: Performed by: UROLOGY

## 2025-03-18 PROCEDURE — A52204 PR CYSTOURETHROSCOPY,BIOPSY: Performed by: ANESTHESIOLOGIST ASSISTANT

## 2025-03-18 PROCEDURE — 2500000005 HC RX 250 GENERAL PHARMACY W/O HCPCS: Performed by: UROLOGY

## 2025-03-18 PROCEDURE — 7100000009 HC PHASE TWO TIME - INITIAL BASE CHARGE: Performed by: UROLOGY

## 2025-03-18 PROCEDURE — A52204 PR CYSTOURETHROSCOPY,BIOPSY: Performed by: ANESTHESIOLOGY

## 2025-03-18 PROCEDURE — RXMED WILLOW AMBULATORY MEDICATION CHARGE

## 2025-03-18 PROCEDURE — 7100000010 HC PHASE TWO TIME - EACH INCREMENTAL 1 MINUTE: Performed by: UROLOGY

## 2025-03-18 PROCEDURE — 7100000001 HC RECOVERY ROOM TIME - INITIAL BASE CHARGE: Performed by: UROLOGY

## 2025-03-18 PROCEDURE — 3600000003 HC OR TIME - INITIAL BASE CHARGE - PROCEDURE LEVEL THREE: Performed by: UROLOGY

## 2025-03-18 PROCEDURE — 99100 ANES PT EXTEME AGE<1 YR&>70: CPT | Performed by: ANESTHESIOLOGY

## 2025-03-18 RX ORDER — PROPOFOL 10 MG/ML
INJECTION, EMULSION INTRAVENOUS AS NEEDED
Status: DISCONTINUED | OUTPATIENT
Start: 2025-03-18 | End: 2025-03-18

## 2025-03-18 RX ORDER — FENTANYL CITRATE 50 UG/ML
INJECTION, SOLUTION INTRAMUSCULAR; INTRAVENOUS AS NEEDED
Status: DISCONTINUED | OUTPATIENT
Start: 2025-03-18 | End: 2025-03-18

## 2025-03-18 RX ORDER — METOCLOPRAMIDE HYDROCHLORIDE 5 MG/ML
10 INJECTION INTRAMUSCULAR; INTRAVENOUS ONCE AS NEEDED
Status: DISCONTINUED | OUTPATIENT
Start: 2025-03-18 | End: 2025-03-18 | Stop reason: HOSPADM

## 2025-03-18 RX ORDER — WATER 1 ML/ML
INJECTION IRRIGATION AS NEEDED
Status: DISCONTINUED | OUTPATIENT
Start: 2025-03-18 | End: 2025-03-18 | Stop reason: HOSPADM

## 2025-03-18 RX ORDER — CEPHALEXIN 250 MG/1
250 CAPSULE ORAL 2 TIMES DAILY
Qty: 6 CAPSULE | Refills: 0 | Status: SHIPPED | OUTPATIENT
Start: 2025-03-18 | End: 2025-03-30 | Stop reason: WASHOUT

## 2025-03-18 RX ORDER — HYDROMORPHONE HYDROCHLORIDE 1 MG/ML
1 INJECTION, SOLUTION INTRAMUSCULAR; INTRAVENOUS; SUBCUTANEOUS EVERY 5 MIN PRN
Status: DISCONTINUED | OUTPATIENT
Start: 2025-03-18 | End: 2025-03-18 | Stop reason: HOSPADM

## 2025-03-18 RX ORDER — DIPHENHYDRAMINE HYDROCHLORIDE 50 MG/ML
25 INJECTION, SOLUTION INTRAMUSCULAR; INTRAVENOUS ONCE AS NEEDED
Status: DISCONTINUED | OUTPATIENT
Start: 2025-03-18 | End: 2025-03-18 | Stop reason: HOSPADM

## 2025-03-18 RX ORDER — LABETALOL HYDROCHLORIDE 5 MG/ML
10 INJECTION, SOLUTION INTRAVENOUS ONCE AS NEEDED
Status: DISCONTINUED | OUTPATIENT
Start: 2025-03-18 | End: 2025-03-18 | Stop reason: HOSPADM

## 2025-03-18 RX ORDER — MEPERIDINE HYDROCHLORIDE 50 MG/ML
12.5 INJECTION INTRAMUSCULAR; INTRAVENOUS; SUBCUTANEOUS EVERY 10 MIN PRN
Status: DISCONTINUED | OUTPATIENT
Start: 2025-03-18 | End: 2025-03-18 | Stop reason: HOSPADM

## 2025-03-18 RX ORDER — ONDANSETRON HYDROCHLORIDE 2 MG/ML
INJECTION, SOLUTION INTRAVENOUS AS NEEDED
Status: DISCONTINUED | OUTPATIENT
Start: 2025-03-18 | End: 2025-03-18

## 2025-03-18 RX ORDER — HYDRALAZINE HYDROCHLORIDE 20 MG/ML
10 INJECTION INTRAMUSCULAR; INTRAVENOUS EVERY 30 MIN PRN
Status: DISCONTINUED | OUTPATIENT
Start: 2025-03-18 | End: 2025-03-18 | Stop reason: HOSPADM

## 2025-03-18 RX ORDER — ALBUTEROL SULFATE 0.83 MG/ML
2.5 SOLUTION RESPIRATORY (INHALATION) ONCE AS NEEDED
Status: DISCONTINUED | OUTPATIENT
Start: 2025-03-18 | End: 2025-03-18 | Stop reason: HOSPADM

## 2025-03-18 RX ORDER — CEFAZOLIN 1 G/1
INJECTION, POWDER, FOR SOLUTION INTRAVENOUS AS NEEDED
Status: DISCONTINUED | OUTPATIENT
Start: 2025-03-18 | End: 2025-03-18

## 2025-03-18 RX ORDER — MORPHINE SULFATE 2 MG/ML
2 INJECTION, SOLUTION INTRAMUSCULAR; INTRAVENOUS EVERY 5 MIN PRN
Status: DISCONTINUED | OUTPATIENT
Start: 2025-03-18 | End: 2025-03-18 | Stop reason: HOSPADM

## 2025-03-18 RX ORDER — CEFAZOLIN SODIUM 2 G/100ML
2 INJECTION, SOLUTION INTRAVENOUS ONCE
Status: DISCONTINUED | OUTPATIENT
Start: 2025-03-18 | End: 2025-03-18 | Stop reason: HOSPADM

## 2025-03-18 RX ORDER — METOPROLOL TARTRATE 1 MG/ML
5 INJECTION, SOLUTION INTRAVENOUS ONCE
Status: DISCONTINUED | OUTPATIENT
Start: 2025-03-18 | End: 2025-03-18 | Stop reason: HOSPADM

## 2025-03-18 RX ORDER — SODIUM CHLORIDE, SODIUM LACTATE, POTASSIUM CHLORIDE, CALCIUM CHLORIDE 600; 310; 30; 20 MG/100ML; MG/100ML; MG/100ML; MG/100ML
100 INJECTION, SOLUTION INTRAVENOUS CONTINUOUS
Status: DISCONTINUED | OUTPATIENT
Start: 2025-03-18 | End: 2025-03-18 | Stop reason: HOSPADM

## 2025-03-18 RX ORDER — LIDOCAINE HCL/PF 100 MG/5ML
SYRINGE (ML) INTRAVENOUS AS NEEDED
Status: DISCONTINUED | OUTPATIENT
Start: 2025-03-18 | End: 2025-03-18

## 2025-03-18 RX ORDER — FAMOTIDINE 10 MG/ML
20 INJECTION, SOLUTION INTRAVENOUS ONCE
Status: DISCONTINUED | OUTPATIENT
Start: 2025-03-18 | End: 2025-03-18 | Stop reason: HOSPADM

## 2025-03-18 RX ORDER — MIDAZOLAM HYDROCHLORIDE 1 MG/ML
1 INJECTION, SOLUTION INTRAMUSCULAR; INTRAVENOUS ONCE AS NEEDED
Status: DISCONTINUED | OUTPATIENT
Start: 2025-03-18 | End: 2025-03-18 | Stop reason: HOSPADM

## 2025-03-18 RX ORDER — SCOPOLAMINE 1 MG/3D
1 PATCH, EXTENDED RELEASE TRANSDERMAL ONCE
Status: DISCONTINUED | OUTPATIENT
Start: 2025-03-18 | End: 2025-03-18 | Stop reason: HOSPADM

## 2025-03-18 RX ORDER — ONDANSETRON HYDROCHLORIDE 2 MG/ML
4 INJECTION, SOLUTION INTRAVENOUS ONCE AS NEEDED
Status: DISCONTINUED | OUTPATIENT
Start: 2025-03-18 | End: 2025-03-18 | Stop reason: HOSPADM

## 2025-03-18 RX ORDER — ACETAMINOPHEN 325 MG/1
975 TABLET ORAL ONCE
Status: DISCONTINUED | OUTPATIENT
Start: 2025-03-18 | End: 2025-03-18 | Stop reason: HOSPADM

## 2025-03-18 RX ADMIN — SODIUM CHLORIDE, POTASSIUM CHLORIDE, SODIUM LACTATE AND CALCIUM CHLORIDE: 600; 310; 30; 20 INJECTION, SOLUTION INTRAVENOUS at 12:13

## 2025-03-18 RX ADMIN — PROPOFOL 150 MG: 10 INJECTION, EMULSION INTRAVENOUS at 12:21

## 2025-03-18 RX ADMIN — PROPOFOL 30 MG: 10 INJECTION, EMULSION INTRAVENOUS at 12:26

## 2025-03-18 RX ADMIN — ONDANSETRON 4 MG: 2 INJECTION INTRAMUSCULAR; INTRAVENOUS at 12:38

## 2025-03-18 RX ADMIN — PROPOFOL 20 MG: 10 INJECTION, EMULSION INTRAVENOUS at 12:29

## 2025-03-18 RX ADMIN — CEFAZOLIN 2 G: 330 INJECTION, POWDER, FOR SOLUTION INTRAMUSCULAR; INTRAVENOUS at 12:25

## 2025-03-18 RX ADMIN — LIDOCAINE HYDROCHLORIDE 60 MG: 20 INJECTION INTRAVENOUS at 12:21

## 2025-03-18 RX ADMIN — FENTANYL CITRATE 50 MCG: 50 INJECTION, SOLUTION INTRAMUSCULAR; INTRAVENOUS at 12:21

## 2025-03-18 SDOH — HEALTH STABILITY: MENTAL HEALTH: CURRENT SMOKER: 0

## 2025-03-18 ASSESSMENT — PAIN SCALES - GENERAL
PAINLEVEL_OUTOF10: 0 - NO PAIN
PAIN_LEVEL: 0
PAINLEVEL_OUTOF10: 0 - NO PAIN

## 2025-03-18 ASSESSMENT — PAIN - FUNCTIONAL ASSESSMENT
PAIN_FUNCTIONAL_ASSESSMENT: 0-10
PAIN_FUNCTIONAL_ASSESSMENT: 0-10

## 2025-03-18 ASSESSMENT — COLUMBIA-SUICIDE SEVERITY RATING SCALE - C-SSRS
2. HAVE YOU ACTUALLY HAD ANY THOUGHTS OF KILLING YOURSELF?: NO
1. IN THE PAST MONTH, HAVE YOU WISHED YOU WERE DEAD OR WISHED YOU COULD GO TO SLEEP AND NOT WAKE UP?: NO
6. HAVE YOU EVER DONE ANYTHING, STARTED TO DO ANYTHING, OR PREPARED TO DO ANYTHING TO END YOUR LIFE?: NO

## 2025-03-18 NOTE — ANESTHESIA POSTPROCEDURE EVALUATION
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) [C67.1])    Surgeons: Harman Castle MD Responsible Provider: Cristian Espinoza MD    Anesthesia Type: general ASA Status: 3            Anesthesia Type: general    Vitals Value Taken Time   /75 03/18/25 1252   Temp 36 °C (96.8 °F) 03/18/25 1252   Pulse 58 03/18/25 1252   Resp 18 03/18/25 1252   SpO2 97 % 03/18/25 1252       Anesthesia Post Evaluation    Patient location during evaluation: PACU  Patient participation: complete - patient participated  Level of consciousness: awake  Pain score: 0  Pain management: adequate  Airway patency: patent  Cardiovascular status: acceptable  Respiratory status: acceptable  Hydration status: acceptable  Postoperative Nausea and Vomiting: none        There were no known notable events for this encounter.

## 2025-03-18 NOTE — BRIEF OP NOTE
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) [C67.1]     Procedures  CYSTOSCOPY/ WITH BLADDER BIOPSY  79094 - [facility] CYSTOURETHROSCOPY WITH BIOPSY      Surgeons      * Harman Castle - Primary    Resident/Fellow/Other Assistant:  Surgeons and Role:  * No surgeons found with a matching role *    Staff:   Circulator: Rabia Saenzub Person: Roxanna    Anesthesia Staff: Anesthesiologist: Cristian Espinoza MD  C-AA: SHANE Boyd    Procedure Summary  Anesthesia: General  ASA: III  Estimated Blood Loss: 0 mL  Intra-op Medications:   Administrations occurring from 1200 to 1320 on 25:   Medication Name Total Dose   sterile water irrigation solution 1,000 mL   ceFAZolin (Ancef) vial 1 g 2 g   fentaNYL (Sublimaze) injection 50 mcg/mL 50 mcg   LR bolus Cannot be calculated   lidocaine (cardiac) injection 2% prefilled syringe 60 mg   ondansetron (Zofran) 2 mg/mL injection 4 mg   propofol (Diprivan) injection 10 mg/mL 200 mg              Anesthesia Record               Intraprocedure I/O Totals       None           Specimen:   ID Type Source Tests Collected by Time   1 : Dome Bladder biopsy Tissue BLADDER BIOPSY SURGICAL PATHOLOGY EXAM Harman Castle MD 3/18/2025 1230   2 : Right eleni trigone biopsy Tissue BLADDER BIOPSY SURGICAL PATHOLOGY EXAM Harman Castle MD 3/18/2025 1233                  Findings: This is an 79-year-old male who is well-known to the urology service because of a history of urothelial carcinoma of the urinary bladder.  A recent outpatient cystoscopy revealed papillary lesions on the dome of the bladder and an erythematous area in the right hemitrigone that were suspicious for recurrent bladder cancer.  A recommendation for a bladder biopsy and fulguration was made.  Recommended weight and BMI control through healthy diet and exercise, green leafy veggies, UV protection, and not smoking. Reviewed the benefits of AREDS VITAMINS VERUS GENOTYPE directed vitamin therapy, and recommended following one or the other to try and prevent the progression of the disease. I advised if patient smokes or has ever smoked to avoid high doses of vitamin A (beta carotene) due to increased risk of lung cancer. Reviewed the importance of daily monitoring of the vision in each eye independently, along with the use of the Amsler grid daily and instructed patient to call and return immediately for any new changes in their vision or on the Amsler grid. Patient instructed on the importance of regular follow up and monitoring for the early detection of conversion to wet AMD as early detection results in early treatment and better outcomes.  He now comes into the operating room to have this performed.  While in the holding area a huddle was performed with the patient, the operating room staff and myself and all agreed that the patient was going forward with cystoscopy bladder biopsy and fulguration.  The patient was brought into the operating room placed on the operating table in the supine position.  A second huddle was performed and all agreed that the patient was going forward with cystoscopy bladder biopsy and fulguration.  The patient was then placed under a general anesthetic.  A third huddle was performed and again all agreed that the patient was going forward with cystoscopy and bladder biopsy.  At this point the patient was placed in the dorsolithotomy position.  His perineum and genital area prepped and draped in usual manner.  The patient has an artificial urethral sphincter in place and this was deactivated.  After deactivation of the urethral sphincter a #22 Yi Storz cystoscope was introduced into the urethra and gently passed into the bladder.  Upon entering the bladder numerous small papillary lesions were identified on the dome of the bladder.  An erythematous area was located in the right hemitrigone.  No other obvious lesions were seen.  At this point using biopsy forceps biopsies were taken from the papillary lesions on the dome of the bladder as well as the right hemitrigone.  These areas were cauterized in their entirety using a Bugbee electrode.  After ensuring adequate hemostasis the bladder was drained and the cystoscope was removed.  The patient was placed in the supine position, awakened and transferred to the recovery room in satisfactory condition.    Complications:  None; patient tolerated the procedure well.     Disposition: PACU - hemodynamically stable.  Condition: stable  Specimens Collected:   ID Type Source Tests Collected by Time   1 : Dome Bladder biopsy Tissue BLADDER BIOPSY SURGICAL PATHOLOGY EXAM Harman Castle,  MD 3/18/2025 1230   2 : Right eleni trigone biopsy Tissue BLADDER BIOPSY SURGICAL PATHOLOGY EXAM Harman Castle MD 3/18/2025 1233     Attending Attestation: I was present and scrubbed for the entire procedure.    Harman Castle  Phone Number: [phone]

## 2025-03-18 NOTE — ANESTHESIA PREPROCEDURE EVALUATION
Patient: Daniel Mcgarry    Procedure Information       Date/Time: 03/18/25 1200    Procedure: CYSTOSCOPY/ WITH BLADDER BIOPSY - Cystoscopy w/Bladder Biopsy    Location: [facility] / [facility]    Surgeons: Harman Castle MD            Relevant Problems   Anesthesia   (-) Difficult intubation   (-) PONV (postoperative nausea and vomiting)      Cardiac   (+) HTN (hypertension)   (+) Hyperlipidemia      Neuro   (+) Carpal tunnel syndrome on right      GI   (+) Acid reflux      /Renal   (+) Prostate cancer (Multi)      Musculoskeletal   (+) Carpal tunnel syndrome on right       Clinical information reviewed:                   NPO Detail:  No data recorded     Physical Exam    Airway  Mallampati: I  TM distance: >3 FB  Neck ROM: full     Cardiovascular - normal exam  Rhythm: regular  Rate: normal     Dental    Pulmonary - normal exam     Abdominal            Anesthesia Plan    History of general anesthesia?: yes  History of complications of general anesthesia?: no    ASA 3     general     The patient is not a current smoker.  Education provided regarding risk of obstructive sleep apnea.  intravenous induction   Postoperative administration of opioids is intended.  Trial extubation is planned.  Anesthetic plan and risks discussed with patient.    Plan discussed with CRNA, CAA and attending.

## 2025-03-18 NOTE — ANESTHESIA PROCEDURE NOTES
Airway  Date/Time: 3/18/2025 12:22 PM  Urgency: elective    Airway not difficult    Staffing  Performed: SHANE   Authorized by: Cristian Espinoza MD    Performed by: SHANE Boyd  Patient location during procedure: OR    Indications and Patient Condition  Indications for airway management: anesthesia  Spontaneous Ventilation: absent  Sedation level: deep  Preoxygenated: yes  Mask difficulty assessment: 0 - not attempted    Final Airway Details  Final airway type: supraglottic airway      Successful airway: Supraglottic airway: i Gel.  Size 4     Number of attempts at approach: 1  Number of other approaches attempted: 0    Additional Comments  Easy LMA 4 insertion

## 2025-03-19 ASSESSMENT — PAIN SCALES - GENERAL: PAINLEVEL_OUTOF10: 0 - NO PAIN

## 2025-03-28 DIAGNOSIS — C67.2 MALIGNANT NEOPLASM OF LATERAL WALL OF URINARY BLADDER (MULTI): ICD-10-CM

## 2025-03-28 RX ORDER — AMINOCAPROIC ACID 500 MG/1
500 TABLET ORAL EVERY 6 HOURS
Qty: 40 TABLET | Refills: 0 | Status: CANCELLED | OUTPATIENT
Start: 2025-03-28 | End: 2025-04-07

## 2025-03-28 RX ORDER — AMINOCAPROIC ACID 500 MG/1
TABLET ORAL
Qty: 40 TABLET | Refills: 0 | Status: SHIPPED | OUTPATIENT
Start: 2025-03-28

## 2025-03-30 ENCOUNTER — APPOINTMENT (OUTPATIENT)
Dept: RADIOLOGY | Facility: HOSPITAL | Age: 80
End: 2025-03-30
Payer: COMMERCIAL

## 2025-03-30 ENCOUNTER — HOSPITAL ENCOUNTER (EMERGENCY)
Facility: HOSPITAL | Age: 80
Discharge: HOME | End: 2025-03-30
Attending: GENERAL PRACTICE
Payer: COMMERCIAL

## 2025-03-30 VITALS
TEMPERATURE: 97.7 F | HEIGHT: 67 IN | BODY MASS INDEX: 28.72 KG/M2 | RESPIRATION RATE: 16 BRPM | DIASTOLIC BLOOD PRESSURE: 85 MMHG | HEART RATE: 78 BPM | WEIGHT: 183 LBS | SYSTOLIC BLOOD PRESSURE: 183 MMHG | OXYGEN SATURATION: 98 %

## 2025-03-30 DIAGNOSIS — A49.9 URINARY TRACT INFECTION, BACTERIAL: ICD-10-CM

## 2025-03-30 DIAGNOSIS — N39.0 URINARY TRACT INFECTION, BACTERIAL: ICD-10-CM

## 2025-03-30 DIAGNOSIS — R31.9 HEMATURIA, UNSPECIFIED TYPE: ICD-10-CM

## 2025-03-30 DIAGNOSIS — R33.9 URINARY RETENTION: Primary | ICD-10-CM

## 2025-03-30 LAB
ABO GROUP (TYPE) IN BLOOD: NORMAL
ALBUMIN SERPL BCP-MCNC: 3.9 G/DL (ref 3.4–5)
ALP SERPL-CCNC: 99 U/L (ref 33–136)
ALT SERPL W P-5'-P-CCNC: 18 U/L (ref 10–52)
ANION GAP SERPL CALC-SCNC: 12 MMOL/L (ref 10–20)
ANTIBODY SCREEN: NORMAL
APPEARANCE UR: ABNORMAL
APTT PPP: 34 SECONDS (ref 26–36)
AST SERPL W P-5'-P-CCNC: 18 U/L (ref 9–39)
BACTERIA #/AREA URNS AUTO: ABNORMAL /HPF
BASOPHILS # BLD AUTO: 0.06 X10*3/UL (ref 0–0.1)
BASOPHILS NFR BLD AUTO: 0.9 %
BILIRUB SERPL-MCNC: 0.4 MG/DL (ref 0–1.2)
BILIRUB UR STRIP.AUTO-MCNC: NEGATIVE MG/DL
BUN SERPL-MCNC: 31 MG/DL (ref 6–23)
CALCIUM SERPL-MCNC: 9.2 MG/DL (ref 8.6–10.3)
CHLORIDE SERPL-SCNC: 108 MMOL/L (ref 98–107)
CO2 SERPL-SCNC: 21 MMOL/L (ref 21–32)
COLOR UR: ABNORMAL
CREAT SERPL-MCNC: 1.11 MG/DL (ref 0.5–1.3)
EGFRCR SERPLBLD CKD-EPI 2021: 67 ML/MIN/1.73M*2
EOSINOPHIL # BLD AUTO: 0.17 X10*3/UL (ref 0–0.4)
EOSINOPHIL NFR BLD AUTO: 2.5 %
ERYTHROCYTE [DISTWIDTH] IN BLOOD BY AUTOMATED COUNT: 13.3 % (ref 11.5–14.5)
GLUCOSE SERPL-MCNC: 121 MG/DL (ref 74–99)
GLUCOSE UR STRIP.AUTO-MCNC: NEGATIVE MG/DL
HCT VFR BLD AUTO: 44.9 % (ref 41–52)
HGB BLD-MCNC: 14 G/DL (ref 13.5–17.5)
IMM GRANULOCYTES # BLD AUTO: 0.04 X10*3/UL (ref 0–0.5)
IMM GRANULOCYTES NFR BLD AUTO: 0.6 % (ref 0–0.9)
INR PPP: 1 (ref 0.9–1.1)
KETONES UR STRIP.AUTO-MCNC: ABNORMAL MG/DL
LEUKOCYTE ESTERASE UR QL STRIP.AUTO: ABNORMAL
LYMPHOCYTES # BLD AUTO: 1.17 X10*3/UL (ref 0.8–3)
LYMPHOCYTES NFR BLD AUTO: 17.2 %
MCH RBC QN AUTO: 29 PG (ref 26–34)
MCHC RBC AUTO-ENTMCNC: 31.2 G/DL (ref 32–36)
MCV RBC AUTO: 93 FL (ref 80–100)
MONOCYTES # BLD AUTO: 0.62 X10*3/UL (ref 0.05–0.8)
MONOCYTES NFR BLD AUTO: 9.1 %
NEUTROPHILS # BLD AUTO: 4.74 X10*3/UL (ref 1.6–5.5)
NEUTROPHILS NFR BLD AUTO: 69.7 %
NITRITE UR QL STRIP.AUTO: NEGATIVE
NRBC BLD-RTO: 0 /100 WBCS (ref 0–0)
PH UR STRIP.AUTO: 5.5 [PH]
PLATELET # BLD AUTO: 198 X10*3/UL (ref 150–450)
POTASSIUM SERPL-SCNC: 4 MMOL/L (ref 3.5–5.3)
PROT SERPL-MCNC: 6.7 G/DL (ref 6.4–8.2)
PROT UR STRIP.AUTO-MCNC: ABNORMAL MG/DL
PROTHROMBIN TIME: 10.8 SECONDS (ref 9.8–12.4)
RBC # BLD AUTO: 4.82 X10*6/UL (ref 4.5–5.9)
RBC # UR STRIP.AUTO: ABNORMAL MG/DL
RBC #/AREA URNS AUTO: >20 /HPF
RH FACTOR (ANTIGEN D): NORMAL
SODIUM SERPL-SCNC: 137 MMOL/L (ref 136–145)
SP GR UR STRIP.AUTO: >1.03
SQUAMOUS #/AREA URNS AUTO: ABNORMAL /HPF
UROBILINOGEN UR STRIP.AUTO-MCNC: ABNORMAL MG/DL
WBC # BLD AUTO: 6.8 X10*3/UL (ref 4.4–11.3)
WBC #/AREA URNS AUTO: ABNORMAL /HPF

## 2025-03-30 PROCEDURE — 74177 CT ABD & PELVIS W/CONTRAST: CPT | Performed by: STUDENT IN AN ORGANIZED HEALTH CARE EDUCATION/TRAINING PROGRAM

## 2025-03-30 PROCEDURE — 85730 THROMBOPLASTIN TIME PARTIAL: CPT | Performed by: NURSE PRACTITIONER

## 2025-03-30 PROCEDURE — 81001 URINALYSIS AUTO W/SCOPE: CPT | Performed by: NURSE PRACTITIONER

## 2025-03-30 PROCEDURE — 51798 US URINE CAPACITY MEASURE: CPT

## 2025-03-30 PROCEDURE — 96366 THER/PROPH/DIAG IV INF ADDON: CPT

## 2025-03-30 PROCEDURE — 99285 EMERGENCY DEPT VISIT HI MDM: CPT | Mod: 25 | Performed by: GENERAL PRACTICE

## 2025-03-30 PROCEDURE — 84075 ASSAY ALKALINE PHOSPHATASE: CPT | Performed by: NURSE PRACTITIONER

## 2025-03-30 PROCEDURE — 86901 BLOOD TYPING SEROLOGIC RH(D): CPT | Performed by: NURSE PRACTITIONER

## 2025-03-30 PROCEDURE — 96365 THER/PROPH/DIAG IV INF INIT: CPT | Mod: 59

## 2025-03-30 PROCEDURE — 2550000001 HC RX 255 CONTRASTS: Performed by: GENERAL PRACTICE

## 2025-03-30 PROCEDURE — 85025 COMPLETE CBC W/AUTO DIFF WBC: CPT | Performed by: NURSE PRACTITIONER

## 2025-03-30 PROCEDURE — 36415 COLL VENOUS BLD VENIPUNCTURE: CPT | Performed by: NURSE PRACTITIONER

## 2025-03-30 PROCEDURE — 87086 URINE CULTURE/COLONY COUNT: CPT | Mod: PARLAB | Performed by: NURSE PRACTITIONER

## 2025-03-30 PROCEDURE — 74177 CT ABD & PELVIS W/CONTRAST: CPT

## 2025-03-30 PROCEDURE — 2500000004 HC RX 250 GENERAL PHARMACY W/ HCPCS (ALT 636 FOR OP/ED): Performed by: GENERAL PRACTICE

## 2025-03-30 PROCEDURE — 85610 PROTHROMBIN TIME: CPT | Performed by: NURSE PRACTITIONER

## 2025-03-30 RX ORDER — MORPHINE SULFATE 4 MG/ML
4 INJECTION, SOLUTION INTRAMUSCULAR; INTRAVENOUS ONCE
Status: DISCONTINUED | OUTPATIENT
Start: 2025-03-30 | End: 2025-03-30 | Stop reason: HOSPADM

## 2025-03-30 RX ORDER — CEFTRIAXONE 1 G/50ML
1 INJECTION, SOLUTION INTRAVENOUS ONCE
Status: COMPLETED | OUTPATIENT
Start: 2025-03-30 | End: 2025-03-30

## 2025-03-30 RX ORDER — CEPHALEXIN 500 MG/1
500 CAPSULE ORAL 4 TIMES DAILY
Qty: 28 CAPSULE | Refills: 0 | Status: SHIPPED | OUTPATIENT
Start: 2025-03-30 | End: 2025-04-02

## 2025-03-30 RX ORDER — ONDANSETRON HYDROCHLORIDE 2 MG/ML
4 INJECTION, SOLUTION INTRAVENOUS ONCE
Status: DISCONTINUED | OUTPATIENT
Start: 2025-03-30 | End: 2025-03-30 | Stop reason: HOSPADM

## 2025-03-30 RX ADMIN — CEFTRIAXONE SODIUM 1 G: 1 INJECTION, SOLUTION INTRAVENOUS at 08:57

## 2025-03-30 RX ADMIN — IOHEXOL 75 ML: 350 INJECTION, SOLUTION INTRAVENOUS at 08:54

## 2025-03-30 ASSESSMENT — PAIN - FUNCTIONAL ASSESSMENT: PAIN_FUNCTIONAL_ASSESSMENT: 0-10

## 2025-03-30 ASSESSMENT — COLUMBIA-SUICIDE SEVERITY RATING SCALE - C-SSRS
1. IN THE PAST MONTH, HAVE YOU WISHED YOU WERE DEAD OR WISHED YOU COULD GO TO SLEEP AND NOT WAKE UP?: NO
6. HAVE YOU EVER DONE ANYTHING, STARTED TO DO ANYTHING, OR PREPARED TO DO ANYTHING TO END YOUR LIFE?: NO
2. HAVE YOU ACTUALLY HAD ANY THOUGHTS OF KILLING YOURSELF?: NO

## 2025-03-30 ASSESSMENT — PAIN DESCRIPTION - PAIN TYPE: TYPE: ACUTE PAIN

## 2025-03-30 ASSESSMENT — PAIN SCALES - GENERAL: PAINLEVEL_OUTOF10: 7

## 2025-03-30 ASSESSMENT — PAIN DESCRIPTION - LOCATION: LOCATION: PELVIS

## 2025-03-30 NOTE — ED PROVIDER NOTES
EMERGENCY DEPARTMENT ENCOUNTER      Pt Name: Daniel Mcgarry  MRN: [id number]  Birthdate 1945  Date of evaluation: 3/30/2025  Provider: TEVIN Chang-CNP    CHIEF COMPLAINT       Chief Complaint   Patient presents with    Urinary Retention       HISTORY OF PRESENT ILLNESS    Daniel Mcgarry is a 79 y.o. male who presents to the emergency department with inability to urinate since last evening -states is only passing blood clots and passed 1 large clot prior to arrival.  He endorses associated suprapubic pressure.  He states that he did have hematuria that started Thursday, called his urologist's office who recommended that he stop the Plavix for a couple of days due to this.  Patient does have a history of bladder cancer, is not on a chemotherapy or radiation.  He does take Plavix, last dose was on Thursday.  States he does have an artificial sphincter and a prostatectomy.    Patient did have cystoscopy with bladder biopsy on 3/18/2025 with Dr. Castle of urology.    He denies any trauma, fall, injury, current smoking, drugs, alcohol, recent travel, or other known sick contacts.  Denies any fevers, chills, nausea, vomiting or any additional symptoms or complaints at this time.    Nursing Notes were reviewed.    History provided by patient.  No  used.    REVIEW OF SYSTEMS     ROS is otherwise negative unless stated above.    PAST MEDICAL HISTORY     Past Medical History:   Diagnosis Date    Acute upper respiratory infection, unspecified 04/15/2015    Viral URI with cough    Angina pectoris     Atopic dermatitis, unspecified 12/08/2013    Atopic dermatitis    Bladder cancer (Multi)     Cancer (Multi)     Chronic obstructive pulmonary disease, unspecified 04/15/2015    Chronic obstructive pulmonary disease    Dental disease     Diarrhea, unspecified 12/08/2013    Diarrhea    Dorsalgia, unspecified     Chronic bilateral back pain, unspecified back location    DVT (deep vein thrombosis) in  pregnancy ([facility])     GERD (gastroesophageal reflux disease)     HL (hearing loss)     Hypertension     Impacted cerumen, unspecified ear 12/08/2013    Cerumen impaction    Mixed hyperlipidemia 12/08/2013    Mixed hyperlipidemia    Myocardial infarction (Multi)     Nocturia 12/08/2013    Nocturia    Other conditions influencing health status 12/08/2013    Neoplasm Of The Epididymis    Otitis media, unspecified, unspecified ear 12/08/2013    Otitis media    Paroxysmal atrial fibrillation (Multi)     Personal history of other specified conditions 11/04/2015    History of dysphagia    Prostate cancer (Multi)     Pulmonary embolism     Vision loss        SURGICAL HISTORY       Past Surgical History:   Procedure Laterality Date    BLADDER SURGERY      COLONOSCOPY      CORONARY STENT PLACEMENT      HERNIA REPAIR      MOUTH SURGERY  11/04/2015    Oral Surgery Tooth Extraction    OTHER SURGICAL HISTORY  08/06/2014    Prostatectomy Retropubic    PROSTATE SURGERY         ALLERGIES     Patient has no known allergies.    FAMILY HISTORY       Family History   Problem Relation Name Age of Onset    Heart disease Mother      Heart disease Father          SOCIAL HISTORY       Social History     Socioeconomic History    Marital status:    Tobacco Use    Smoking status: Former     Current packs/day: 0.00     Average packs/day: 1 pack/day for 50.0 years (50.0 ttl pk-yrs)     Types: Cigarettes     Start date: 1960     Quit date: 2010     Years since quitting: 15.2    Smokeless tobacco: Never   Vaping Use    Vaping status: Never Used   Substance and Sexual Activity    Alcohol use: Yes     Comment: occasional    Drug use: Never     Social Drivers of Health     Food Insecurity: No Food Insecurity (7/31/2024)    Hunger Vital Sign     Worried About Running Out of Food in the Last Year: Never true     Ran Out of Food in the Last Year: Never true       PHYSICAL EXAM   VS: As documented in the triage note and EMR flowsheet from this  visit were reviewed.    GEN: NAD, nontoxic, well-appearing, ambulates without difficulty  EYES:  PERRLA  HEENT: Airway patent  CARD: RRR, nontender chest, no crepitus deformities, no JVD, no murmurs rubs or gallops ; No edema noted.  Positive pulses bilaterally throughout.  Capillary refill less than 3 seconds.  No abnormal redness, warmth, tenderness or swelling noted to bilateral lower extremities.  PULMONARY: Clear all lung fields. Moving air well, Nonlabored, no accessory muscle use, able to speak complete sentences  ABDOMEN: Abdomen soft, non-distended, no rebound, no guarding. Bowel sounds normal in all 4 quadrants. Suprapubic tenderness to palpation.  No CVA tenderness.  No masses or organomegaly noted.  No evidence of peritonitis. Negative Venegas's and McBurney's point tenderness.   : deferred  MUSK: Spine appears normal, range of motion is not limited, no muscle or joint tenderness. Strength 5 out of 5 equal bilaterally throughout.  No step-offs, deformities or additional signs of trauma noted.  No spinal/midline tenderness to palpation  SKIN: Skin normal color for race, warm, dry and intact. No evidence of trauma. No rash noted.  NEURO: Alert and oriented x 3, speech is clear, no obvious deficits noted. GCS 15      DIAGNOSTIC RESULTS   RADIOLOGY:   Non-plain film images such as CT, Ultrasound and MRI are read by the radiologist. Plain radiographic images are visualized and preliminarily interpreted by myself with the below findings: none      Interpretation per the Radiologist below, if available at the time of this note:    CT abdomen pelvis w IV contrast   Final Result   1. Posterior urinary bladder hyperdensity measuring 2.5 x 1.1 cm.   Unclear if representing blood clot or urothelial pathology/neoplasm.   Advise correlation with the urinalysis and accordingly further   assessment to be considered including urology referral. Tiny anti   dependent urinary bladder gas locules (advise correlation with    history of recent instrumentation).   2. Uncomplicated colonic diverticulosis.   3. Moderate hiatal hernia.   4. Trace uncomplicated cholelithiasis.        Significant results of the study were relayed by me to and   acknowledged by Tameka Crawford CNP on 03/30/2025 at 9:40 a.m.   through [employer] secure chat.        MACRO:   None        Signed by: Peewee Novak 3/30/2025 9:42 AM   Dictation workstation:   [id number]            ED BEDSIDE ULTRASOUND:   Performed by myself - none    LABS:  Labs Reviewed   URINALYSIS WITH REFLEX CULTURE AND MICROSCOPIC - Abnormal       Result Value    Color, Urine Red-brown (*)     Appearance, Urine Cloudy (*)     Specific Gravity, Urine >1.030 (*)     pH, Urine 5.5      Protein, Urine 100 (2+) (*)     Glucose, Urine NEGATIVE      Blood, Urine 1.0 (3+) (*)     Ketones, Urine 5 (TRACE) (*)     Bilirubin, Urine NEGATIVE      Urobilinogen, Urine NORM      Nitrite, Urine NEGATIVE      Leukocyte Esterase, Urine 500 Timbo/uL (*)    CBC WITH AUTO DIFFERENTIAL - Abnormal    WBC 6.8      nRBC 0.0      RBC 4.82      Hemoglobin 14.0      Hematocrit 44.9      MCV 93      MCH 29.0      MCHC 31.2 (*)     RDW 13.3      Platelets 198      Neutrophils % 69.7      Immature Granulocytes %, Automated 0.6      Lymphocytes % 17.2      Monocytes % 9.1      Eosinophils % 2.5      Basophils % 0.9      Neutrophils Absolute 4.74      Immature Granulocytes Absolute, Automated 0.04      Lymphocytes Absolute 1.17      Monocytes Absolute 0.62      Eosinophils Absolute 0.17      Basophils Absolute 0.06     COMPREHENSIVE METABOLIC PANEL - Abnormal    Glucose 121 (*)     Sodium 137      Potassium 4.0      Chloride 108 (*)     Bicarbonate 21      Anion Gap 12      Urea Nitrogen 31 (*)     Creatinine 1.11      eGFR 67      Calcium 9.2      Albumin 3.9      Alkaline Phosphatase 99      Total Protein 6.7      AST 18      Bilirubin, Total 0.4      ALT 18     MICROSCOPIC ONLY, URINE - Abnormal    WBC, Urine 6-10 (*)     RBC,  "Urine >20 (*)     Squamous Epithelial Cells, Urine 1-9 (SPARSE)      Bacteria, Urine 2+ (*)    PROTIME-INR - Normal    Protime 10.8      INR 1.0     APTT - Normal    aPTT 34      Narrative:     The APTT is no longer used for monitoring Unfractionated Heparin Therapy. For monitoring Heparin Therapy, use the Heparin Assay.   URINE CULTURE   TYPE AND SCREEN    ABO TYPE O      Rh TYPE POS      ANTIBODY SCREEN NEG     URINALYSIS WITH REFLEX CULTURE AND MICROSCOPIC    Narrative:     The following orders were created for panel order Urinalysis with Reflex Culture and Microscopic.  Procedure                               Abnormality         Status                     ---------                               -----------         ------                     Urinalysis with Reflex C...[[id number]]  Abnormal            Final result               Extra Urine Gray Tube[[id number]]                                                         Please view results for these tests on the individual orders.   EXTRA URINE GRAY TUBE       All other labs were within normal range or not returned as of this dictation.    EMERGENCY DEPARTMENT COURSE/MDM:   Vitals:    Vitals:    03/30/25 0624 03/30/25 1212   BP: (!) 184/92 (!) 183/85   BP Location: Right arm    Patient Position: Sitting    Pulse: 94 78   Resp: 16 16   Temp: 36.5 °C (97.7 °F)    TempSrc: Temporal    SpO2: 98% 98%   Weight: 83 kg (183 lb)    Height: 1.702 m (5' 7\")        I reviewed the patient's triage vitals.    This is an 79-year-old male who presents ED for evaluation of urinary retention and hematuria.  He is ambulatory without difficulty but in mild distress due to his lower abdominal/suprapubic pain.  He has no peritonitis on my examination.  Assessment is otherwise reassuring.  He is given medications for his symptoms.    Bedside RN to perform bladder scan -321 mL.  Three-way Crespo catheter was ordered for irrigation.    Three-way Crespo catheter was attempted x 2 by bedside RN " and 1 time by myself all of which were unsuccessful.  Therefore I placed a consult for urology.  Shortly after my attempt patient was able to void approximately 200 cc pink urine without any clots.  Postvoid residual bladder scanner was obtained which revealed 121 cc.  He has improvement in his symptoms.  Diagnoses as of 03/30/25 1214   Urinary retention   Hematuria, unspecified type   Urinary tract infection, bacterial     Initial workup was reviewed.  He has no DAVID or leukocytosis.  He does have a UTI and will be initiated on Rocephin.    CT scan reviewed which showed blood clot versus neoplasm.  I did speak with  urology who reviewed the patient's workup.  As he has no significant drop in his hemoglobin, DAVID or leukocytosis they recommended repeat void trial with PVR and if it is less than 100 patient should be okay to be discharged home.  They recommend that patient also continue to hold his Plavix until he has follow-up with urology.    Patient was able to void again, endorses significant improvement of his symptoms/resolution.  Urine is now clear in color.  PVR 24.  I informed urology of this and they stated that patient is cleared for discharge home and can follow-up this week for outpatient evaluation.  Patient feels improved and reassured.  He remained he medically stable.  He is educated continued symptomatic and supportive care at home.  He is educated on signs symptoms of worsening UTI.  Educated maintain proper hydration    Patient was counseled regarding labs, imaging, likely diagnosis, and plan. All questions were answered.     ------------------------------------------------------------------  EKG Interpretation: N/A    Differential Diagnoses Considered: Hematuria, urinary retention, clots, mass, bladder cancer, kidney dysfunction, UTI, anemia    Chronic Medical Conditions Significantly Affecting Care: None    External Records Reviewed: I reviewed recent and relevant outside records including: PCP  notes, prior discharge summary, previous radiologic studies, HIE    Escalation of Care:  Appropriate for outpatient management with urology follow-up in the next 2 days for reevaluation long-term management.  Return precautions were discussed and he verbalized understanding.  All questions and concerns were addressed.    Social Determinants of Health Significantly Affecting Care:  None    Prescription Drug Consideration: Course of Keflex p.o. for UTI treatment at home.  Over-the-counter Tylenol as needed for pain and to continue his daily medications except for Plavix until his follow-up    Diagnostic testing considered: No additional indicated this time    Discussion of Management with Other Providers:   I discussed the patient/results with: urology Dr. Everton Zuñiga      ------------------------------------------------------------------  ED Medications administered this visit:    Medications   morphine injection 4 mg (4 mg intravenous Not Given 3/30/25 1212)   ondansetron (Zofran) injection 4 mg (4 mg intravenous Not Given 3/30/25 1212)   cefTRIAXone (Rocephin) 1 g in dextrose (iso) IV 50 mL (0 g intravenous Stopped 3/30/25 1031)   iohexol (OMNIPaque) 350 mg iodine/mL solution 75 mL (75 mL intravenous Given 3/30/25 0854)       New Prescriptions from this visit:    New Prescriptions    CEPHALEXIN (KEFLEX) 500 MG CAPSULE    Take 1 capsule (500 mg) by mouth 4 times a day for 7 days.       Follow-up:  Felice Urrutia  [id number] [facility] [address]  [place] [id number] [phone]    Schedule an appointment as soon as possible for a visit in 1 week  reevaluation    urology    Schedule an appointment as soon as possible for a visit in 2 days  reevaluation        Final Impression:   1. Urinary retention    2. Hematuria, unspecified type    3. Urinary tract infection, bacterial          Tameka Crawford, APRN-CNP    This patient was staffed with ED Attending Dr. Hernandez to review the plan of care during ED  course.    (Please note that portions of this note were completed with a voice recognition program.  Efforts were made to edit the dictations but occasionally words are mis-transcribed.)     Tameka Crawford, TEVIN-PEPPER  03/30/25 3924

## 2025-03-30 NOTE — ED TRIAGE NOTES
80 y/o male complains of inability to urinate all night. Patient with history of bladder cancer. States he is only passing blood clots.

## 2025-03-31 ENCOUNTER — APPOINTMENT (OUTPATIENT)
Dept: UROLOGY | Facility: CLINIC | Age: 80
End: 2025-03-31
Payer: COMMERCIAL

## 2025-04-01 LAB — BACTERIA UR CULT: ABNORMAL

## 2025-04-02 ENCOUNTER — TELEPHONE (OUTPATIENT)
Dept: PHARMACY | Facility: HOSPITAL | Age: 80
End: 2025-04-02
Payer: COMMERCIAL

## 2025-04-02 LAB
LABORATORY COMMENT REPORT: NORMAL
PATH REPORT.FINAL DX SPEC: NORMAL
PATH REPORT.GROSS SPEC: NORMAL
PATH REPORT.RELEVANT HX SPEC: NORMAL
PATH REPORT.TOTAL CANCER: NORMAL

## 2025-04-02 NOTE — PROGRESS NOTES
EDPD Note: Rapid Result Review    I reviewed Daniel Mcgarry 's chart regarding a positive urine culture/result that was taken during their recent emergency room visit. The patient was not told about these results prior to leaving the emergency department. Therefore, patient was contacted and given appropriate education.       Pt with pmh of bladder cancer presented to ED on 3/30 with inability to urinate. Stated he was only passing blood clots and endorsed hematuria. Consulted urology and Plavix was discontinued. Discharged to follow up with urology but appointment was canceled by patient. Prescribed Keflex 500 mg qid x 7 days which is inappropriate. Today patient stated symptoms had resolved. Counseled patient that due to bacterial resistance towards Keflex, symptoms were likely not due to a UTI and recommended patient discontinue abx. Patient agreed with plan and stated he has been following with his urologist and had no further questions.     Susceptibility data from last 90 days.  Collected Specimen Info Organism Amoxicillin/Clavulanate Ampicillin Ampicillin/Sulbactam Cefazolin Cefepime Ciprofloxacin Gentamicin Nitrofurantoin Piperacillin/Tazobactam Trimethoprim/Sulfamethoxazole   03/30/25 Urine from Clean Catch/Voided Enterobacter cloacae complex  R  R  R  R  S  S  S  I  S  S     Admission on 03/30/2025, Discharged on 03/30/2025   Component Date Value Ref Range Status    Color, Urine 03/30/2025 Red-brown (N)  Straw, Yellow Final    Appearance, Urine 03/30/2025 Cloudy (N)  Clear Final    Specific Gravity, Urine 03/30/2025 >1.030 (N)  1.005 - 1.035 Final    pH, Urine 03/30/2025 5.5  5.0, 5.5, 6.0, 6.5, 7.0, 7.5, 8.0 Final    Protein, Urine 03/30/2025 100 (2+) (A)  NEGATIVE, 10 (TRACE) mg/dL Final    Glucose, Urine 03/30/2025 NEGATIVE  NEGATIVE mg/dL Final    Blood, Urine 03/30/2025 1.0 (3+) (A)  NEGATIVE mg/dL Final    Ketones, Urine 03/30/2025 5 (TRACE) (A)  NEGATIVE mg/dL Final    Bilirubin, Urine 03/30/2025  NEGATIVE  NEGATIVE mg/dL Final    Urobilinogen, Urine 03/30/2025 NORM  NORM mg/dL Final    Nitrite, Urine 03/30/2025 NEGATIVE  NEGATIVE Final    Leukocyte Esterase, Urine 03/30/2025 500 Timbo/uL (A)  NEGATIVE Final    WBC 03/30/2025 6.8  4.4 - 11.3 x10*3/uL Final    nRBC 03/30/2025 0.0  0.0 - 0.0 /100 WBCs Final    RBC 03/30/2025 4.82  4.50 - 5.90 x10*6/uL Final    Hemoglobin 03/30/2025 14.0  13.5 - 17.5 g/dL Final    Hematocrit 03/30/2025 44.9  41.0 - 52.0 % Final    MCV 03/30/2025 93  80 - 100 fL Final    MCH 03/30/2025 29.0  26.0 - 34.0 pg Final    MCHC 03/30/2025 31.2 (L)  32.0 - 36.0 g/dL Final    RDW 03/30/2025 13.3  11.5 - 14.5 % Final    Platelets 03/30/2025 198  150 - 450 x10*3/uL Final    Neutrophils % 03/30/2025 69.7  40.0 - 80.0 % Final    Immature Granulocytes %, Automated 03/30/2025 0.6  0.0 - 0.9 % Final    Immature Granulocyte Count (IG) includes promyelocytes, myelocytes and metamyelocytes but does not include bands. Percent differential counts (%) should be interpreted in the context of the absolute cell counts (cells/UL).    Lymphocytes % 03/30/2025 17.2  13.0 - 44.0 % Final    Monocytes % 03/30/2025 9.1  2.0 - 10.0 % Final    Eosinophils % 03/30/2025 2.5  0.0 - 6.0 % Final    Basophils % 03/30/2025 0.9  0.0 - 2.0 % Final    Neutrophils Absolute 03/30/2025 4.74  1.60 - 5.50 x10*3/uL Final    Percent differential counts (%) should be interpreted in the context of the absolute cell counts (cells/uL).    Immature Granulocytes Absolute, Au* 03/30/2025 0.04  0.00 - 0.50 x10*3/uL Final    Lymphocytes Absolute 03/30/2025 1.17  0.80 - 3.00 x10*3/uL Final    Monocytes Absolute 03/30/2025 0.62  0.05 - 0.80 x10*3/uL Final    Eosinophils Absolute 03/30/2025 0.17  0.00 - 0.40 x10*3/uL Final    Basophils Absolute 03/30/2025 0.06  0.00 - 0.10 x10*3/uL Final    Glucose 03/30/2025 121 (H)  74 - 99 mg/dL Final    Sodium 03/30/2025 137  136 - 145 mmol/L Final    Potassium 03/30/2025 4.0  3.5 - 5.3 mmol/L Final     Chloride 03/30/2025 108 (H)  98 - 107 mmol/L Final    Bicarbonate 03/30/2025 21  21 - 32 mmol/L Final    Anion Gap 03/30/2025 12  10 - 20 mmol/L Final    Urea Nitrogen 03/30/2025 31 (H)  6 - 23 mg/dL Final    Creatinine 03/30/2025 1.11  0.50 - 1.30 mg/dL Final    eGFR 03/30/2025 67  >60 mL/min/1.73m*2 Final    Calculations of estimated GFR are performed using the 2021 CKD-EPI Study Refit equation without the race variable for the IDMS-Traceable creatinine methods.  https://jasn.asnjournals.org/content/early/2021/09/22/ASN.6005595403    Calcium 03/30/2025 9.2  8.6 - 10.3 mg/dL Final    Albumin 03/30/2025 3.9  3.4 - 5.0 g/dL Final    Alkaline Phosphatase 03/30/2025 99  33 - 136 U/L Final    Total Protein 03/30/2025 6.7  6.4 - 8.2 g/dL Final    AST 03/30/2025 18  9 - 39 U/L Final    Bilirubin, Total 03/30/2025 0.4  0.0 - 1.2 mg/dL Final    ALT 03/30/2025 18  10 - 52 U/L Final    Patients treated with Sulfasalazine may generate falsely decreased results for ALT.    Protime 03/30/2025 10.8  9.8 - 12.4 seconds Final    INR 03/30/2025 1.0  0.9 - 1.1 Final    aPTT 03/30/2025 34  26 - 36 seconds Final    ABO TYPE 03/30/2025 O   Final    Rh TYPE 03/30/2025 POS   Final    ANTIBODY SCREEN 03/30/2025 NEG   Final    WBC, Urine 03/30/2025 6-10 (A)  1-5, NONE /HPF Final    RBC, Urine 03/30/2025 >20 (A)  NONE, 1-2, 3-5 /HPF Final    Squamous Epithelial Cells, Urine 03/30/2025 1-9 (SPARSE)  Reference range not established. /HPF Final    Bacteria, Urine 03/30/2025 2+ (A)  NONE SEEN /HPF Final    Urine Culture 03/30/2025 >=100,000 CFU/mL Enterobacter cloacae complex (A)   Final    SECOND and THIRD generation cephalosporin results are not reported as resistance may develop during therapy with these agents.       No further follow up needed from EDPD Team.     If there are any other questions for the ED Post-Discharge Culture Follow Up Team, please contact [phone]. Fax: [phone].    Nisreen PortilloD

## 2025-04-21 NOTE — PROGRESS NOTES
Subjective   Patient ID: Daniel Mcgarry is a 79 y.o. male who presents for A F/U AFTER UNDERGOING A CYSTO , BLADDER BX AND FULGURATION ON 3-18-25-- THE BX OF THE DOME REVEALED HIGH GRADE UROTHELIAL CARCINOMA -- NO MUSCLE INVASION.  POST OP THE PT DEVELOPED DIFFICULTY IN VOIDING.  HE WENT TO THE  ER AND SHORTLY THEREAFTER HE WAS ABLE TO VOID.  HE WAS SENT HOME WITHOUT A CATHETER.     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  THE BX FROM THE TRIGONE AREA WAS NEG FOR CANCER.     OLD RECORDS FROM [facility] REVIEWED   PT S/P TURBT 11-6-13 -- NON INVASIVE UROTHELIAL CARCINOMA  PT S/P BLADDER BX ON 1-22-19-- CARCINOMA IN SITU NOTED -- BCG STARTED ON 3-12-19  PT S/P BLADDER BX ON 9-3-19-- PERSISTENT CIS-- BCG WITH INTERFERON STARTED-- INTERFERON STOPPED ON 11-12-19 SECONDARY TO COST  POSITIVE FISH TEST 3-1-20, 6-11-20, 3-11-21 AND 6-15-23  CYSTO'S, AND IVP DONE DURING THIS TIME WERE ALL NEG FOR OBVIOUS CANCER  PT S/P BLADDER BX ON 1-27-24 -- CIS AGAIN NOTED-- BCG RESTARTED ON 3-12-24  PT IS NO LONGER GETTING BCG THERAPY      ABN CYSTO 2-24-25-- AREA OF SMALL GROUPING OF SMALL PAPILLOMAS ON THE DOME OF THE BLADDER - BLADDER BX ON 3-8-25 REVEALED  HIGH GRADE UROTHELIAL CARCINOMA    H/O PROSTATE CANCER  PT S/P RADICAL PROSTATECTOMY 2-10-00 -- DR MEANS  LAST PSA ON 9-22-21  WAS  UNDETECTABLE   P:  REASSURANCE EDUCATION, SUPPORTIVE CARE RENDERED TO THE PT   F/U IN THREE MONTHS FOR A CYSTO     Harman Castle MD 04/21/25 6:56 PM

## 2025-04-23 ENCOUNTER — APPOINTMENT (OUTPATIENT)
Age: 80
End: 2025-04-23
Payer: COMMERCIAL

## 2025-04-23 VITALS
DIASTOLIC BLOOD PRESSURE: 73 MMHG | HEIGHT: 66 IN | TEMPERATURE: 96.9 F | SYSTOLIC BLOOD PRESSURE: 135 MMHG | HEART RATE: 76 BPM | BODY MASS INDEX: 30.63 KG/M2 | WEIGHT: 190.6 LBS

## 2025-04-23 DIAGNOSIS — C67.9 MALIGNANT NEOPLASM OF URINARY BLADDER, UNSPECIFIED SITE (MULTI): ICD-10-CM

## 2025-04-23 DIAGNOSIS — C61 PROSTATE CANCER (MULTI): Primary | ICD-10-CM

## 2025-04-23 PROCEDURE — 3075F SYST BP GE 130 - 139MM HG: CPT | Performed by: UROLOGY

## 2025-04-23 PROCEDURE — 3078F DIAST BP <80 MM HG: CPT | Performed by: UROLOGY

## 2025-04-23 PROCEDURE — 1036F TOBACCO NON-USER: CPT | Performed by: UROLOGY

## 2025-04-23 PROCEDURE — 99024 POSTOP FOLLOW-UP VISIT: CPT | Performed by: UROLOGY

## 2025-04-23 PROCEDURE — 1159F MED LIST DOCD IN RCRD: CPT | Performed by: UROLOGY

## 2025-04-23 PROCEDURE — 1160F RVW MEDS BY RX/DR IN RCRD: CPT | Performed by: UROLOGY

## 2025-04-23 NOTE — LETTER
April 26, 2025     Felice Urrutia MD  [address]  [place]  [facility] [address]    Patient: Daniel Mcgarry   YOB: 1945   Date of Visit: 4/23/2025       Dear Dr. Felice Urrutia:    Thank you for referring Daniel Mcgarry to me for evaluation. Below are my notes for this consultation.  If you have questions, please do not hesitate to call me. I look forward to following your patient along with you.       Sincerely,     Harman Castle MD      CC: No Recipients  ______________________________________________________________________________________    Subjective  Patient ID: Daniel Mcgarry is a 79 y.o. male who presents for A F/U AFTER UNDERGOING A CYSTO , BLADDER BX AND FULGURATION ON 3-18-25-- THE BX OF THE DOME REVEALED HIGH GRADE UROTHELIAL CARCINOMA -- NO MUSCLE INVASION.  POST OP THE PT DEVELOPED DIFFICULTY IN VOIDING.  HE WENT TO THE  ER AND SHORTLY THEREAFTER HE WAS ABLE TO VOID.  HE WAS SENT HOME WITHOUT A CATHETER.     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  THE BX FROM THE TRIGONE AREA WAS NEG FOR CANCER.     OLD RECORDS FROM [facility] REVIEWED   PT S/P TURBT 11-6-13 -- NON INVASIVE UROTHELIAL CARCINOMA  PT S/P BLADDER BX ON 1-22-19-- CARCINOMA IN SITU NOTED -- BCG STARTED ON 3-12-19  PT S/P BLADDER BX ON 9-3-19-- PERSISTENT CIS-- BCG WITH INTERFERON STARTED-- INTERFERON STOPPED ON 11-12-19 SECONDARY TO COST  POSITIVE FISH TEST 3-1-20, 6-11-20, 3-11-21 AND  6-15-23  CYSTO'S, AND IVP DONE DURING THIS TIME WERE ALL NEG FOR OBVIOUS CANCER  PT S/P BLADDER BX ON 1-27-24 -- CIS AGAIN NOTED-- BCG RESTARTED ON 3-12-24  PT IS NO LONGER GETTING BCG THERAPY      ABN CYSTO 2-24-25-- AREA OF SMALL GROUPING OF SMALL PAPILLOMAS ON THE DOME OF THE BLADDER - BLADDER BX ON 3-8-25 REVEALED  HIGH GRADE UROTHELIAL CARCINOMA    H/O PROSTATE CANCER  PT S/P RADICAL PROSTATECTOMY 2-10-00 -- DR MEANS  LAST PSA ON 9-22-21  WAS  UNDETECTABLE   P:  REASSURANCE EDUCATION, SUPPORTIVE CARE RENDERED TO THE PT   F/U IN THREE MONTHS FOR A CYSTO     Harman Castle MD 04/21/25 6:56 PM

## 2025-06-01 LAB
ATRIAL RATE: 73 BPM
P AXIS: 31 DEGREES
P OFFSET: 190 MS
P ONSET: 135 MS
PR INTERVAL: 180 MS
Q ONSET: 225 MS
QRS COUNT: 12 BEATS
QRS DURATION: 130 MS
QT INTERVAL: 396 MS
QTC CALCULATION(BAZETT): 436 MS
QTC FREDERICIA: 423 MS
R AXIS: -13 DEGREES
T AXIS: 21 DEGREES
T OFFSET: 423 MS
VENTRICULAR RATE: 73 BPM

## 2025-07-15 RX ORDER — LIDOCAINE HYDROCHLORIDE 20 MG/ML
1 JELLY TOPICAL ONCE
Status: COMPLETED | OUTPATIENT
Start: 2025-07-15 | End: 2025-07-21

## 2025-07-20 NOTE — PROGRESS NOTES
Patient ID: Daniel Mcgarry is a 80 y.o. male.  who presents for A SURVEILLANCE CYSTO  IN F/U ON  HIS H/O  BLADDER CANCER.    Are you experiencing:  Burning on urination -- NO  Pain on urination  -- NO  Urinary frequency -- NO  Urinary urgency -- NO  Urge incontinence -- NO  Urinary stress incontinence -- OCC  NUMBER OF PULL UPS CHANGED PER DAY -- 1 X / DAY-- WET  RARE ENURESIS  Nocturia-- 2 X ON AVG  Hematuria -- NO  Hesitancy -- NO  Post void fullness --NO    PROCEDURE:; CYSTOSCOPY   PRE OP:  H/O BLADDER CANCER  POST OP -- NORMAL CYSTO   SURGEON -- BRAULIO GOODRICH  ANES:  1 % LIDOCAINE  FINDINGS:  After informed consent was obtained, the patient was taken to the procedure room for cystoscopy due to A H/O  BLADDER CANCER     Cystoscopy     Procedure Note:    A sterile prep and drape was performed in standard fashion. Lidocaine was used for topical anesthesia. A flexible cystoscope was inserted into the urethra without difficulty revealing normal urethra.     The prostate -- ABSENT      Then entered the bladder revealing bladder mucosa with no erythematous patches or plaques, foreign bodies, stones or papillary lesions. The ureteral orifices were visualized bilaterally. These were orthotopic in location and effluxing clear urine. No masses were seen on retroflexion.     Post-Procedure:   The cystoscope was removed. The vital signs were stable . The patient tolerated the procedure well. There were no complications.       PSA:  9-22-21-- < 0.01  10-1-20-- <  0.1  10-3-19-- < 0.1  4-21-16-- < 0.1    URINE CYSTOLOGY:  11-18-24-- NEG FOR MALIGNANT CELLS    7-31-24--  NEG FOR MALIGNANT CELLS   A:   PT S/P BLADDER BX AND FULGURATION ON 3-18-25-- THE BX OF THE DOME REVEALED HIGH GRADE UROTHELIAL CARCINOMA -- NO MUSCLE INVASION.  THE BX FROM THE TRIGONE AREA WAS NEG FOR CANCER.   NORMAL CYSTO TODAY 7-21-25     OLD RECORDS FROM [facility] REVIEWED   PT S/P TURBT 11-6-13 -- NON INVASIVE UROTHELIAL CARCINOMA  PT S/P BLADDER BX ON 1-22-19--  CARCINOMA IN SITU NOTED -- BCG STARTED ON 3-12-19  PT S/P BLADDER BX ON 9-3-19-- PERSISTENT CIS-- BCG WITH INTERFERON STARTED-- INTERFERON STOPPED ON 11-12-19 SECONDARY TO COST  POSITIVE FISH TEST 3-1-20, 6-11-20, 3-11-21 AND 6-15-23  CYSTO'S, AND IVP DONE DURING THIS TIME WERE ALL NEG FOR OBVIOUS CANCER  PT S/P BLADDER BX ON 1-27-24 -- CIS AGAIN NOTED-- BCG RESTARTED ON 3-12-24  PT IS NO LONGER GETTING BCG THERAPY       H/O PROSTATE CANCER  PT S/P RADICAL PROSTATECTOMY 2-10-00 -- DR MEANS  LAST PSA ON 9-22-21  WAS  UNDETECTABLE   P:  REASSURANCE EDUCATION, SUPPORTIVE CARE RENDERED TO THE PT   URINE FOR  CYTOLOGY  F/U IN THREE MONTHS FOR A JERRI RAMSEY MD  7-21-25

## 2025-07-21 ENCOUNTER — APPOINTMENT (OUTPATIENT)
Age: 80
End: 2025-07-21
Payer: COMMERCIAL

## 2025-07-21 VITALS — TEMPERATURE: 98.2 F

## 2025-07-21 DIAGNOSIS — C61 PROSTATE CANCER (MULTI): Primary | ICD-10-CM

## 2025-07-21 DIAGNOSIS — R31.9 HEMATURIA, UNSPECIFIED TYPE: ICD-10-CM

## 2025-07-21 DIAGNOSIS — C67.9 MALIGNANT NEOPLASM OF URINARY BLADDER, UNSPECIFIED SITE (MULTI): ICD-10-CM

## 2025-07-21 DIAGNOSIS — Z79.2 PROPHYLACTIC ANTIBIOTIC: ICD-10-CM

## 2025-07-21 DIAGNOSIS — N39.3 URINARY INCONTINENCE, STRESS, MALE: ICD-10-CM

## 2025-07-21 DIAGNOSIS — R32 ENURESIS: ICD-10-CM

## 2025-07-21 PROCEDURE — 99213 OFFICE O/P EST LOW 20 MIN: CPT | Performed by: UROLOGY

## 2025-07-21 PROCEDURE — 81003 URINALYSIS AUTO W/O SCOPE: CPT | Performed by: UROLOGY

## 2025-07-21 PROCEDURE — 52000 CYSTOURETHROSCOPY: CPT | Performed by: UROLOGY

## 2025-07-21 RX ORDER — CEPHALEXIN 250 MG/1
250 CAPSULE ORAL 2 TIMES DAILY
Qty: 4 CAPSULE | Refills: 0 | Status: SHIPPED | OUTPATIENT
Start: 2025-07-21 | End: 2025-07-23

## 2025-07-21 RX ADMIN — LIDOCAINE HYDROCHLORIDE 1 APPLICATION: 20 JELLY TOPICAL at 14:58

## 2025-07-21 NOTE — LETTER
July 22, 2025     Felice Urrutia MD  [address]  [place]  [facility] [address]    Patient: Daniel Mcgarry   YOB: 1945   Date of Visit: 7/21/2025       Dear Dr. Felice Urrutia:    Thank you for referring Daniel Mcgarry to me for evaluation. Below are my notes for this consultation.  If you have questions, please do not hesitate to call me. I look forward to following your patient along with you.       Sincerely,     Harman Castle MD      CC: No Recipients  ______________________________________________________________________________________    Patient ID: Daniel Mcgarry is a 80 y.o. male.  who presents for A SURVEILLANCE CYSTO  IN F/U ON  HIS H/O  BLADDER CANCER.    Are you experiencing:  Burning on urination -- NO  Pain on urination  -- NO  Urinary frequency -- NO  Urinary urgency -- NO  Urge incontinence -- NO  Urinary stress incontinence -- OCC  NUMBER OF PULL UPS CHANGED PER DAY -- 1 X / DAY-- WET  RARE ENURESIS  Nocturia-- 2 X ON AVG  Hematuria -- NO  Hesitancy -- NO  Post void fullness --NO    PROCEDURE:; CYSTOSCOPY   PRE OP:  H/O BLADDER CANCER  POST OP -- NORMAL CYSTO   SURGEON -- BRAULIO GOODRICH  ANES:  1 % LIDOCAINE  FINDINGS:  After informed consent was obtained, the patient was taken to the procedure room for cystoscopy due to A H/O  BLADDER CANCER     Cystoscopy     Procedure Note:    A sterile prep and drape was performed in standard fashion. Lidocaine was used for topical anesthesia. A flexible cystoscope was inserted into the urethra without difficulty revealing normal urethra.     The prostate -- ABSENT      Then entered the bladder revealing bladder mucosa with no erythematous patches or plaques, foreign bodies, stones or papillary lesions. The ureteral orifices were visualized bilaterally. These were orthotopic in location and effluxing clear urine. No masses were seen on retroflexion.     Post-Procedure:   The cystoscope was removed. The vital signs were stable . The  patient tolerated the procedure well. There were no complications.       PSA:  9-22-21-- < 0.01  10-1-20-- <  0.1  10-3-19-- < 0.1  4-21-16-- < 0.1    URINE CYSTOLOGY:  11-18-24-- NEG FOR MALIGNANT CELLS    7-31-24--  NEG FOR MALIGNANT CELLS   A:   PT S/P BLADDER BX AND FULGURATION ON 3-18-25-- THE BX OF THE DOME REVEALED HIGH GRADE UROTHELIAL CARCINOMA -- NO MUSCLE INVASION.  THE BX FROM THE TRIGONE AREA WAS NEG FOR CANCER.   NORMAL CYSTO TODAY 7-21-25     OLD RECORDS FROM [facility] REVIEWED   PT S/P TURBT 11-6-13 -- NON INVASIVE UROTHELIAL CARCINOMA  PT S/P BLADDER BX ON 1-22-19-- CARCINOMA IN SITU NOTED -- BCG STARTED ON 3-12-19  PT S/P BLADDER BX ON 9-3-19-- PERSISTENT CIS-- BCG WITH INTERFERON STARTED-- INTERFERON STOPPED ON 11-12-19 SECONDARY TO COST  POSITIVE FISH TEST 3-1-20, 6-11-20, 3-11-21 AND 6-15-23  CYSTO'S, AND IVP DONE DURING THIS TIME WERE ALL NEG FOR OBVIOUS CANCER  PT S/P BLADDER BX ON 1-27-24 -- CIS AGAIN NOTED-- BCG RESTARTED ON 3-12-24  PT IS NO LONGER GETTING BCG THERAPY       H/O PROSTATE CANCER  PT S/P RADICAL PROSTATECTOMY 2-10-00 -- DR MEANS  LAST PSA ON 9-22-21  WAS  UNDETECTABLE   P:  REASSURANCE EDUCATION, SUPPORTIVE CARE RENDERED TO THE PT   URINE FOR  CYTOLOGY  F/U IN THREE MONTHS FOR A CYSTO   MIELDA RAMSEY MD  7-21-25

## 2025-07-22 LAB
BACTERIA #/AREA URNS HPF: ABNORMAL /HPF
HYALINE CASTS #/AREA URNS LPF: ABNORMAL /LPF
RBC #/AREA URNS HPF: ABNORMAL /HPF
SERVICE CMNT-IMP: ABNORMAL
SQUAMOUS #/AREA URNS HPF: ABNORMAL /HPF
WBC #/AREA URNS HPF: ABNORMAL /HPF

## 2025-07-24 ENCOUNTER — LAB (OUTPATIENT)
Dept: LAB | Facility: HOSPITAL | Age: 80
End: 2025-07-24
Payer: COMMERCIAL

## 2025-07-24 DIAGNOSIS — C67.9 MALIGNANT NEOPLASM OF URINARY BLADDER, UNSPECIFIED SITE (MULTI): ICD-10-CM

## 2025-07-24 PROCEDURE — 88112 CYTOPATH CELL ENHANCE TECH: CPT

## 2025-10-22 ENCOUNTER — APPOINTMENT (OUTPATIENT)
Age: 80
End: 2025-10-22
Payer: COMMERCIAL

## (undated) DEVICE — DRESSING, TELFA, 3X4

## (undated) DEVICE — Device

## (undated) DEVICE — BAG, DRAINAGE, URINARY, W/MONO-FLO ANTI-REFLUX DEVICE, NEEDLESS SAMPLING PORT,SPLASHGUARD II/SAFEGUARD, 2000 CC, STERILE, LF

## (undated) DEVICE — COLLECTION BAG, FLUID, NON-STERILE